# Patient Record
Sex: FEMALE | Race: WHITE | Employment: FULL TIME | URBAN - METROPOLITAN AREA
[De-identification: names, ages, dates, MRNs, and addresses within clinical notes are randomized per-mention and may not be internally consistent; named-entity substitution may affect disease eponyms.]

---

## 2018-05-08 ENCOUNTER — HOSPITAL ENCOUNTER (OUTPATIENT)
Age: 28
Setting detail: OUTPATIENT SURGERY
Discharge: HOME | End: 2018-05-08
Attending: INTERNAL MEDICINE | Admitting: INTERNAL MEDICINE
Payer: COMMERCIAL

## 2018-05-08 ENCOUNTER — ANESTHESIA EVENT (OUTPATIENT)
Dept: ENDOSCOPY | Age: 28
End: 2018-05-08
Payer: COMMERCIAL

## 2018-05-08 ENCOUNTER — ANESTHESIA (OUTPATIENT)
Dept: ENDOSCOPY | Age: 28
End: 2018-05-08
Payer: COMMERCIAL

## 2018-05-08 VITALS
SYSTOLIC BLOOD PRESSURE: 118 MMHG | RESPIRATION RATE: 10 BRPM | DIASTOLIC BLOOD PRESSURE: 68 MMHG | HEART RATE: 71 BPM | BODY MASS INDEX: 41.68 KG/M2 | OXYGEN SATURATION: 95 % | WEIGHT: 250.19 LBS | TEMPERATURE: 97.9 F | HEIGHT: 65 IN

## 2018-05-08 LAB — HCG UR QL: NEGATIVE

## 2018-05-08 PROCEDURE — 76060000031 HC ANESTHESIA FIRST 0.5 HR: Performed by: INTERNAL MEDICINE

## 2018-05-08 PROCEDURE — 74011000250 HC RX REV CODE- 250

## 2018-05-08 PROCEDURE — 76040000019: Performed by: INTERNAL MEDICINE

## 2018-05-08 PROCEDURE — 81025 URINE PREGNANCY TEST: CPT

## 2018-05-08 PROCEDURE — 74011250636 HC RX REV CODE- 250/636: Performed by: INTERNAL MEDICINE

## 2018-05-08 PROCEDURE — 77030019988 HC FCPS ENDOSC DISP BSC -B: Performed by: INTERNAL MEDICINE

## 2018-05-08 PROCEDURE — 74011250636 HC RX REV CODE- 250/636

## 2018-05-08 PROCEDURE — 88305 TISSUE EXAM BY PATHOLOGIST: CPT | Performed by: INTERNAL MEDICINE

## 2018-05-08 RX ORDER — SODIUM CHLORIDE 0.9 % (FLUSH) 0.9 %
5-10 SYRINGE (ML) INJECTION AS NEEDED
Status: DISCONTINUED | OUTPATIENT
Start: 2018-05-08 | End: 2018-05-08 | Stop reason: HOSPADM

## 2018-05-08 RX ORDER — SODIUM CHLORIDE 0.9 % (FLUSH) 0.9 %
5-10 SYRINGE (ML) INJECTION EVERY 8 HOURS
Status: DISCONTINUED | OUTPATIENT
Start: 2018-05-08 | End: 2018-05-08 | Stop reason: HOSPADM

## 2018-05-08 RX ORDER — SODIUM CHLORIDE 9 MG/ML
75 INJECTION, SOLUTION INTRAVENOUS CONTINUOUS
Status: DISCONTINUED | OUTPATIENT
Start: 2018-05-08 | End: 2018-05-08 | Stop reason: HOSPADM

## 2018-05-08 RX ORDER — NALOXONE HYDROCHLORIDE 0.4 MG/ML
0.4 INJECTION, SOLUTION INTRAMUSCULAR; INTRAVENOUS; SUBCUTANEOUS
Status: DISCONTINUED | OUTPATIENT
Start: 2018-05-08 | End: 2018-05-08 | Stop reason: HOSPADM

## 2018-05-08 RX ORDER — FENTANYL CITRATE 50 UG/ML
25 INJECTION, SOLUTION INTRAMUSCULAR; INTRAVENOUS
Status: DISCONTINUED | OUTPATIENT
Start: 2018-05-08 | End: 2018-05-08 | Stop reason: HOSPADM

## 2018-05-08 RX ORDER — EPINEPHRINE 0.1 MG/ML
1 INJECTION INTRACARDIAC; INTRAVENOUS
Status: DISCONTINUED | OUTPATIENT
Start: 2018-05-08 | End: 2018-05-08 | Stop reason: HOSPADM

## 2018-05-08 RX ORDER — LIDOCAINE HYDROCHLORIDE 20 MG/ML
INJECTION, SOLUTION EPIDURAL; INFILTRATION; INTRACAUDAL; PERINEURAL AS NEEDED
Status: DISCONTINUED | OUTPATIENT
Start: 2018-05-08 | End: 2018-05-08 | Stop reason: HOSPADM

## 2018-05-08 RX ORDER — ATROPINE SULFATE 0.1 MG/ML
0.5 INJECTION INTRAVENOUS
Status: DISCONTINUED | OUTPATIENT
Start: 2018-05-08 | End: 2018-05-08 | Stop reason: HOSPADM

## 2018-05-08 RX ORDER — GLYCOPYRROLATE 0.2 MG/ML
INJECTION INTRAMUSCULAR; INTRAVENOUS AS NEEDED
Status: DISCONTINUED | OUTPATIENT
Start: 2018-05-08 | End: 2018-05-08 | Stop reason: HOSPADM

## 2018-05-08 RX ORDER — FLUMAZENIL 0.1 MG/ML
0.2 INJECTION INTRAVENOUS
Status: DISCONTINUED | OUTPATIENT
Start: 2018-05-08 | End: 2018-05-08 | Stop reason: HOSPADM

## 2018-05-08 RX ORDER — ZINC GLUCONATE 10 MG
LOZENGE ORAL
COMMUNITY
End: 2020-03-25

## 2018-05-08 RX ORDER — DEXTROMETHORPHAN/PSEUDOEPHED 2.5-7.5/.8
1.2 DROPS ORAL
Status: DISCONTINUED | OUTPATIENT
Start: 2018-05-08 | End: 2018-05-08 | Stop reason: HOSPADM

## 2018-05-08 RX ORDER — CETIRIZINE HCL 10 MG
10 TABLET ORAL DAILY
COMMUNITY

## 2018-05-08 RX ORDER — RANITIDINE HCL 75 MG
75 TABLET ORAL DAILY
COMMUNITY
End: 2019-07-30

## 2018-05-08 RX ORDER — MIDAZOLAM HYDROCHLORIDE 1 MG/ML
1-2 INJECTION, SOLUTION INTRAMUSCULAR; INTRAVENOUS
Status: DISCONTINUED | OUTPATIENT
Start: 2018-05-08 | End: 2018-05-08 | Stop reason: HOSPADM

## 2018-05-08 RX ORDER — GLUCOSAMINE/CHONDR SU A SOD 750-600 MG
TABLET ORAL
COMMUNITY
End: 2020-03-25

## 2018-05-08 RX ORDER — MIDAZOLAM HYDROCHLORIDE 1 MG/ML
.25-5 INJECTION, SOLUTION INTRAMUSCULAR; INTRAVENOUS
Status: DISCONTINUED | OUTPATIENT
Start: 2018-05-08 | End: 2018-05-08 | Stop reason: HOSPADM

## 2018-05-08 RX ORDER — OMEPRAZOLE 20 MG/1
20 CAPSULE, DELAYED RELEASE ORAL DAILY
COMMUNITY
End: 2018-10-30 | Stop reason: SDUPTHER

## 2018-05-08 RX ORDER — PROPOFOL 10 MG/ML
INJECTION, EMULSION INTRAVENOUS AS NEEDED
Status: DISCONTINUED | OUTPATIENT
Start: 2018-05-08 | End: 2018-05-08 | Stop reason: HOSPADM

## 2018-05-08 RX ORDER — MELATONIN
1000 DAILY
COMMUNITY
End: 2019-07-30

## 2018-05-08 RX ADMIN — PROPOFOL 400 MG: 10 INJECTION, EMULSION INTRAVENOUS at 09:34

## 2018-05-08 RX ADMIN — LIDOCAINE HYDROCHLORIDE 100 MG: 20 INJECTION, SOLUTION EPIDURAL; INFILTRATION; INTRACAUDAL; PERINEURAL at 09:25

## 2018-05-08 RX ADMIN — GLYCOPYRROLATE 0.2 MG: 0.2 INJECTION INTRAMUSCULAR; INTRAVENOUS at 09:25

## 2018-05-08 RX ADMIN — SODIUM CHLORIDE 75 ML/HR: 900 INJECTION, SOLUTION INTRAVENOUS at 09:18

## 2018-05-08 NOTE — ANESTHESIA POSTPROCEDURE EVALUATION
Post-Anesthesia Evaluation and Assessment    Patient: Jersey Junior MRN: [id number]  SSN: [id number]    YOB: 1990  Age: 29 y.o. Sex: female       Cardiovascular Function/Vital Signs  Visit Vitals    /68    Pulse 71    Temp 36.6 °C (97.9 °F)    Resp 10    Ht 5' 5\" (1.651 m)    Wt 113.5 kg (250 lb 3 oz)    SpO2 95%    Breastfeeding No    BMI 41.63 kg/m2       Patient is status post total IV anesthesia anesthesia for Procedure(s):  ESOPHAGOGASTRODUODENOSCOPY (EGD)  ESOPHAGOGASTRODUODENAL (EGD) BIOPSY. Nausea/Vomiting: None    Postoperative hydration reviewed and adequate. Pain:  Pain Scale 1: Numeric (0 - 10) (05/08/18 0958)  Pain Intensity 1: 0 (05/08/18 0958)   Managed    Neurological Status: At baseline    Mental Status and Level of Consciousness: Arousable    Pulmonary Status:   O2 Device: Room air (05/08/18 0958)   Adequate oxygenation and airway patent    Complications related to anesthesia: None    Post-anesthesia assessment completed.  No concerns    Signed By: Prakash Nguyen DO     May 8, 2018

## 2018-05-08 NOTE — PERIOP NOTES
Latrice Randolph  1990  [id number]    Situation:  Verbal report received from: ANGELINE Santoro rn  Procedure: Procedure(s):  ESOPHAGOGASTRODUODENOSCOPY (EGD)  ESOPHAGOGASTRODUODENAL (EGD) BIOPSY    Background:    Preoperative diagnosis: gerd  Postoperative diagnosis: GERD    :  Dr. Brian Ivy  Assistant(s): Endoscopy Technician-1: Rik Palacio  Endoscopy RN-1: Abel Mast RN    Specimens:   ID Type Source Tests Collected by Time Destination   1 : [place] bx Preservative   Tahira Dan MD 5/8/2018 0932 Pathology     H. Pylori  no    Assessment:  Anesthesia gave intra-procedure sedation and medications, see anesthesia flow sheet yes    Intravenous fluids: NS@ KVO     Vital signs stable     Abdominal assessment: round and soft     Recommendation:  Discharge patient per MD order.     Family or Friend   Permission to share finding with family or friend yes

## 2018-05-08 NOTE — DISCHARGE INSTRUCTIONS
[place]  [id number]  1990    EGD DISCHARGE INSTRUCTIONS  Discomfort:  Sore throat- throat lozenges or warm salt water gargle  redness at IV site- apply warm compress to area; if redness or soreness persist- contact your physician  Gaseous discomfort- walking, belching will help relieve any discomfort  You may not operate a vehicle for 12 hours  You may not engage in an occupation involving machinery or appliances for rest of today  You may not drink alcoholic beverages for at least 12 hours  Avoid making any critical decisions for at least 24 hour  DIET  You may have minimal sips at this time-- do not eat or drink for two hours. You may eat and drink after 0945am  You may resume your regular diet - however -  remember your colon is empty and a heavy meal will produce gas. Avoid these foods:  vegetables, fried / greasy foods, carbonated drinks    MEDICATIONS:        ACTIVITY  You may resume your normal daily activities until tomorrow AM;  Spend the remainder of the day resting -  avoid any strenuous activity. CALL M.D. ANY SIGN OF   Increasing pain, nausea, vomiting  Abdominal distension (swelling)  New increased bleeding (oral or rectal)  Fever (chills)  Pain in chest area  Bloody discharge from nose or mouth  Shortness of breath    IMPRESSION:  -Normal esophageal mucosa; biopsied to exclude esophagitis  -Normal stomach  -Normal duodenum    Follow-up Instructions:   Call Dr. Jonathan Melvin for the results of procedure / biopsy in 7-10 days   Telephone # [phone]  Appointment in 58-80 days    Deidra Nguyen MD     Flextrip Activation    Thank you for requesting access to Flextrip. Please follow the instructions below to securely access and download your online medical record. Flextrip allows you to send messages to your doctor, view your test results, renew your prescriptions, schedule appointments, and more. How Do I Sign Up? 1. In your internet browser, go to [email or web address]  2.  Click on the First Time User? Click Here link in the Sign In box. You will be redirect to the New Member Sign Up page. 3. Enter your walkby Access Code exactly as it appears below. You will not need to use this code after youve completed the sign-up process. If you do not sign up before the expiration date, you must request a new code. walkby Access Code: [email or web address]  Expires: 2018  8:00 AM (This is the date your walkby access code will )    4. Enter the last four digits of your Social Security Number (xxxx) and Date of Birth (mm/dd/yyyy) as indicated and click Submit. You will be taken to the next sign-up page. 5. Create a Correlsenset ID. This will be your walkby login ID and cannot be changed, so think of one that is secure and easy to remember. 6. Create a walkby password. You can change your password at any time. 7. Enter your Password Reset Question and Answer. This can be used at a later time if you forget your password. 8. Enter your e-mail address. You will receive e-mail notification when new information is available in [address] 9. Click Sign Up. You can now view and download portions of your medical record. 10. Click the Download Summary menu link to download a portable copy of your medical information. Additional Information    If you have questions, please visit the Frequently Asked Questions section of the walkby website at [email or web address] Remember, walkby is NOT to be used for urgent needs. For medical emergencies, dial 911.

## 2018-05-08 NOTE — IP AVS SNAPSHOT
[address] [facility] 
[phone] Patient: Tawny Harrell MRN: [id number] About your hospitalization You were admitted on: May 8, 2018 You last received care in the:  [facility] ENDOSCOPY You were discharged on: May 8, 2018 Why you were hospitalized Your primary diagnosis was:  Not on File Follow-up Information None Discharge Orders None A check sosa indicates which time of day the medication should be taken. My Medications CONTINUE taking these medications Instructions Each Dose to Equal  
 Morning Noon Evening Bedtime Biotin 2,500 mcg Cap Your last dose was: Your next dose is: Take  by mouth.  
     
   
   
   
  
 magnesium 250 mg Tab Your last dose was: Your next dose is: Take  by mouth. OTHER Your last dose was: Your next dose is: Low estrogen birth control PriLOSEC 20 mg capsule Generic drug:  omeprazole Your last dose was: Your next dose is: Take 20 mg by mouth daily. 20 mg  
    
   
   
   
  
 VITAMIN D3 1,000 unit tablet Generic drug:  cholecalciferol Your last dose was: Your next dose is: Take 1,000 Units by mouth daily. 1000 Units ZANTAC 75 75 mg tablet Generic drug:  raNITIdine Your last dose was: Your next dose is: Take 75 mg by mouth two (2) times a day. 75 mg  
    
   
   
   
  
 ZyrTEC 10 mg tablet Generic drug:  cetirizine Your last dose was: Your next dose is: Take  by mouth. Discharge Instructions Tawny Harrell 
[id number] 1990 EGD DISCHARGE INSTRUCTIONS Discomfort: 
Sore throat- throat lozenges or warm salt water gargle redness at IV site- apply warm compress to area; if redness or soreness persist- contact your physician Gaseous discomfort- walking, belching will help relieve any discomfort You may not operate a vehicle for 12 hours You may not engage in an occupation involving machinery or appliances for rest of today You may not drink alcoholic beverages for at least 12 hours Avoid making any critical decisions for at least 24 hour DIET You may have minimal sips at this time-- do not eat or drink for two hours. You may eat and drink after 0945am 
You may resume your regular diet  however -  remember your colon is empty and a heavy meal will produce gas. Avoid these foods:  vegetables, fried / greasy foods, carbonated drinks MEDICATIONS: 
 
 
 
ACTIVITY You may resume your normal daily activities until tomorrow AM; 
Spend the remainder of the day resting -  avoid any strenuous activity. CALL M.D. ANY SIGN OF Increasing pain, nausea, vomiting Abdominal distension (swelling) New increased bleeding (oral or rectal) Fever (chills) Pain in chest area Bloody discharge from nose or mouth Shortness of breath IMPRESSION: 
-Normal esophageal mucosa; biopsied to exclude esophagitis 
-Normal stomach 
-Normal duodenum Follow-up Instructions: 
 Call Dr. Zac Izquierdo for the results of procedure / biopsy in 7-10 days Telephone # [phone] Appointment in 60-90 days Del Nissen, MD 
 
 Revolution Prep Activation Thank you for requesting access to Revolution Prep. Please follow the instructions below to securely access and download your online medical record. Revolution Prep allows you to send messages to your doctor, view your test results, renew your prescriptions, schedule appointments, and more. How Do I Sign Up? 1. In your internet browser, go to [email or web address] 
2. Click on the First Time User? Click Here link in the Sign In box. You will be redirect to the New Member Sign Up page. 3. Enter your Fitbay Access Code exactly as it appears below. You will not need to use this code after youve completed the sign-up process. If you do not sign up before the expiration date, you must request a new code. Fitbay Access Code: [email or web address] Expires: 2018  8:00 AM (This is the date your Fitbay access code will ) 4. Enter the last four digits of your Social Security Number (xxxx) and Date of Birth (mm/dd/yyyy) as indicated and click Submit. You will be taken to the next sign-up page. 5. Create a Tower Semiconductort ID. This will be your Fitbay login ID and cannot be changed, so think of one that is secure and easy to remember. 6. Create a Fitbay password. You can change your password at any time. 7. Enter your Password Reset Question and Answer. This can be used at a later time if you forget your password. 8. Enter your e-mail address. You will receive e-mail notification when new information is available in [address] 9. Click Sign Up. You can now view and download portions of your medical record. 10. Click the Download Summary menu link to download a portable copy of your medical information. Additional Information If you have questions, please visit the Frequently Asked Questions section of the Fitbay website at [email or web address] Remember, Fitbay is NOT to be used for urgent needs. For medical emergencies, dial 911. Introducing [facility]! [employer] introduces Fitbay patient portal. Now you can access parts of your medical record, email your doctor's office, and request medication refills online. 1. In your internet browser, go to [email or web address] 2. Click on the First Time User? Click Here link in the Sign In box. You will see the New Member Sign Up page. 3. Enter your Fitbay Access Code exactly as it appears below. You will not need to use this code after youve completed the sign-up process.  If you do not sign up before the expiration date, you must request a new code. · GreenDust Access Code: [email or web address] Expires: 8/6/2018  8:00 AM 
 
4. Enter the last four digits of your Social Security Number (xxxx) and Date of Birth (mm/dd/yyyy) as indicated and click Submit. You will be taken to the next sign-up page. 5. Create a GreenDust ID. This will be your GreenDust login ID and cannot be changed, so think of one that is secure and easy to remember. 6. Create a GreenDust password. You can change your password at any time. 7. Enter your Password Reset Question and Answer. This can be used at a later time if you forget your password. 8. Enter your e-mail address. You will receive e-mail notification when new information is available in [address] 9. Click Sign Up. You can now view and download portions of your medical record. 10. Click the Download Summary menu link to download a portable copy of your medical information. If you have questions, please visit the Frequently Asked Questions section of the GreenDust website. Remember, GreenDust is NOT to be used for urgent needs. For medical emergencies, dial 911. Now available from your iPhone and Android! Introducing Arben Wong As a [employer] patient, I wanted to make you aware of our electronic visit tool called Arben Wong. [employer] 24/7 allows you to connect within minutes with a medical provider 24 hours a day, seven days a week via a mobile device or tablet or logging into a secure website from your computer. You can access Arben Wong from anywhere in the [place].  
 
A virtual visit might be right for you when you have a simple condition and feel like you just dont want to get out of bed, or cant get away from work for an appointment, when your regular [employer] provider is not available (evenings, weekends or holidays), or when youre out of town and need minor care. Electronic visits cost only $49 and if the kWhOURS 24/7 provider determines a prescription is needed to treat your condition, one can be electronically transmitted to a nearby pharmacy*. Please take a moment to enroll today if you have not already done so. The enrollment process is free and takes just a few minutes. To enroll, please download the [employer] 24/[employer] jose to your tablet or phone, or visit [email or web address] to enroll on your computer. And, as an [address] [place] [address] patient with a [employer] account, the results of your visits will be scanned into your electronic medical record and your primary care provider will be able to view the scanned results. We urge you to continue to see your regular [employer] provider for your ongoing medical care. And while your primary care provider may not be the one available when you seek a Trippin In virtual visit, the peace of mind you get from getting a real diagnosis real time can be priceless. For more information on Trippin In, view our Frequently Asked Questions (FAQs) at [email or web address]. Sincerely, 
 
Sowmya Lopez MD 
Chief Medical Officer [employer] *:  certain medications cannot be prescribed via Trippin In Providers Seen During Your Hospitalization Provider Specialty Primary office phone Jameel Angela MD Gastroenterology [phone] Your Primary Care Physician (PCP) Primary Care Physician Office Phone Office Fax Daniele Cox [phone] You are allergic to the following No active allergies Recent Documentation Height Weight Breastfeeding? BMI OB Status Smoking Status 1.651 m 113.5 kg No 41.63 kg/m2 Having regular periods Never Smoker Emergency Contacts Name Discharge Info Relation Home Work Mobile Merlin Jorge DISCHARGE CAREGIVER [3] Spouse [3] [phone] Patient Belongings The following personal items are in your possession at time of discharge: 
  Dental Appliances: None Please provide this summary of care documentation to your next provider. Signatures-by signing, you are acknowledging that this After Visit Summary has been reviewed with you and you have received a copy. Patient Signature:  ____________________________________________________________ Date:  ____________________________________________________________  
  
Juju Piety Provider Signature:  ____________________________________________________________ Date:  ____________________________________________________________

## 2018-05-08 NOTE — PROGRESS NOTES
Anesthesia reports 400 mg Propofol, 100 mg Lidocaine and 600 mL NS given during procedure. Received report from anesthesia staff on vital signs and status of patient.

## 2018-05-08 NOTE — ANESTHESIA PREPROCEDURE EVALUATION
Anesthetic History   No history of anesthetic complications            Review of Systems / Medical History  Patient summary reviewed, nursing notes reviewed and pertinent labs reviewed    Pulmonary  Within defined limits                 Neuro/Psych   Within defined limits           Cardiovascular  Within defined limits                Exercise tolerance: >4 METS     GI/Hepatic/Renal  Within defined limits              Endo/Other        Obesity     Other Findings              Physical Exam    Airway  Mallampati: II  TM Distance: 4 - 6 cm  Neck ROM: normal range of motion   Mouth opening: Normal     Cardiovascular  Regular rate and rhythm,  S1 and S2 normal,  no murmur, click, rub, or gallop             Dental  No notable dental hx       Pulmonary  Breath sounds clear to auscultation               Abdominal  GI exam deferred       Other Findings            Anesthetic Plan    ASA: 2  Anesthesia type: total IV anesthesia and MAC          Induction: Intravenous  Anesthetic plan and risks discussed with: Patient

## 2018-07-30 ENCOUNTER — OFFICE VISIT (OUTPATIENT)
Dept: INTERNAL MEDICINE CLINIC | Age: 28
End: 2018-07-30

## 2018-07-30 VITALS
BODY MASS INDEX: 40.15 KG/M2 | WEIGHT: 241 LBS | RESPIRATION RATE: 16 BRPM | TEMPERATURE: 98.3 F | HEIGHT: 65 IN | DIASTOLIC BLOOD PRESSURE: 77 MMHG | HEART RATE: 89 BPM | OXYGEN SATURATION: 99 % | SYSTOLIC BLOOD PRESSURE: 109 MMHG

## 2018-07-30 DIAGNOSIS — E66.01 OBESITY, MORBID (HCC): ICD-10-CM

## 2018-07-30 DIAGNOSIS — Z00.00 PHYSICAL EXAM, ANNUAL: Primary | ICD-10-CM

## 2018-07-30 DIAGNOSIS — Z23 ENCOUNTER FOR IMMUNIZATION: ICD-10-CM

## 2018-07-30 RX ORDER — SERTRALINE HYDROCHLORIDE 50 MG/1
50 TABLET, FILM COATED ORAL DAILY
Qty: 30 TAB | Refills: 2 | Status: SHIPPED | OUTPATIENT
Start: 2018-07-30 | End: 2018-10-02 | Stop reason: SDUPTHER

## 2018-07-30 NOTE — PROGRESS NOTES
After obtaining consent, and per verbal order from Dr. Nga Bailey, patient received TDAP vaccine given by Prosper Zavaleta LPN in L Deltoid. TDAP Vaccine 0.5 mL IM now. Patient was observed for 10 minutes post injection. Patient tolerated injection well. VIS given.

## 2018-07-30 NOTE — PROGRESS NOTES
HISTORY OF PRESENT ILLNESS  Harrison Vasquez is a 29 y.o. female. HPI  Pt is here to establish care. /77, will repeat this today    Pt has lost 25 lbs recently  Pt did Whole 30 diet to start  More recently has just been cutting out carbs and sugar  Pt wants to lose more wt, but will maintain for now so that she will fit in her wedding dress  Pt will restart her wt loss after her wedding    Labs ordered    Pt had EGD with Dr Loren Guerra in 5/18: nl  Pt is taking prilosec daily and zantac BID for heartburn  Pt has f/u in 8/18    Pt states she has been dealing with anxiety for a long time  Denies depression  She feels that this is wearing on other people  It does not negatively affect her job, but she has been stressed  Ordered zoloft 50mg    Pt c/o HA x 8 years  She believes these are menstrual migraines, as they occur with her periods  Pt has discussed these sx with her gyn    PMHx: GERD    FMHx: Mother - renal cell carcinoma  Father - HTN    PSHx: [place] teeth extraction  Upper GI endoscopy    SHx: Never smoker  Occasional alcohol  Not , engaged (wedding in 9/18)  No children  Works as a physical therapist    PREVENTIVE:  Colonoscopy: not yet needed  Pap: neoSurgical, 07/30/18   Mammogram: not yet needed  Dexa: not yet needed  Tdap: 07/30/18  Pneumovax: not yet needed  Shingrix: not yet needed  Flu shot: Fall 2017  Eye exam: 3/18  Lipids: ordered 07/30/18    There are no active problems to display for this patient. Current Outpatient Prescriptions   Medication Sig Dispense Refill    omeprazole (PRILOSEC) 20 mg capsule Take 20 mg by mouth daily.  raNITIdine (ZANTAC 75) 75 mg tablet Take 75 mg by mouth daily.  cetirizine (ZYRTEC) 10 mg tablet Take  by mouth.  OTHER Low estrogen birth control      magnesium 250 mg tab Take  by mouth.  Biotin 2,500 mcg cap Take  by mouth.  cholecalciferol (VITAMIN D3) 1,000 unit tablet Take 1,000 Units by mouth daily.        Past Surgical History:   Procedure Laterality Date    HX WISDOM TEETH EXTRACTION      UPPER GI ENDOSCOPY,BIOPSY  5/8/2018           No results found for: WBC, WBCT, WBCPOC, HGB, HGBPOC, HCT, HCTPOC, PLT, PLTPOC, MCV, MCVPOC, HGBEXT, HCTEXT, PLTEXT  No results found for: CHOL, CHOLPOCT, HDL, LDL, LDLC, LDLCPOC, LDLCEXT, TRIGL, TGLPOCT, CHHD, CHHDX  No results found for: GFRNA, GFRNAPOC, GFRAA, GFRAAPOC, CREA, CREAPOC, BUN, IBUN, BUNPOC, NA, NAPOC, K, KPOCT, CL, CLPOC, CO2, CO2POC, MG, PHOS, ALBEU, PTH, PTHILT, EPO     Review of Systems   Constitutional: Negative for chills and fever. HENT: Negative for hearing loss and tinnitus. Eyes: Negative for blurred vision and double vision. Respiratory: Negative for shortness of breath and wheezing. Cardiovascular: Negative for chest pain and palpitations. Gastrointestinal: Negative for nausea and vomiting. Genitourinary: Negative for dysuria and frequency. Musculoskeletal: Negative for back pain and falls. Skin: Negative for itching and rash. Neurological: Positive for headaches. Negative for dizziness and loss of consciousness. Psychiatric/Behavioral: Negative for depression. The patient is nervous/anxious. Physical Exam   Constitutional: She is oriented to person, place, and time. She appears well-developed and well-nourished. No distress. HENT:   Head: Normocephalic and atraumatic. Right Ear: External ear normal.   Left Ear: External ear normal.   Mouth/Throat: Oropharynx is clear and moist. No oropharyngeal exudate. Eyes: Conjunctivae and EOM are normal. Pupils are equal, round, and reactive to light. Right eye exhibits no discharge. Left eye exhibits no discharge. No scleral icterus. Neck: Normal range of motion. Neck supple. No carotid bruits    Cardiovascular: Normal rate, regular rhythm, normal heart sounds and intact distal pulses. Exam reveals no gallop and no friction rub. No murmur heard.   Pulmonary/Chest: Effort normal and breath sounds normal. No respiratory distress. She has no wheezes. She has no rales. She exhibits no tenderness. Abdominal: Soft. She exhibits no distension and no mass. There is no tenderness. There is no rebound and no guarding. Musculoskeletal: Normal range of motion. She exhibits no edema, tenderness or deformity. Lymphadenopathy:     She has no cervical adenopathy. Neurological: She is alert and oriented to person, place, and time. Coordination normal.   Skin: Skin is warm and dry. No rash noted. She is not diaphoretic. No erythema. No pallor. Psychiatric: She has a normal mood and affect. Her behavior is normal.       ASSESSMENT and PLAN    ICD-10-CM ICD-9-CM    1. Physical exam, annual    Discussed need for diet and w/l, she has already lost about 20lbs and is working aggressively on this, due for labs, ordered, tdap today, BP good, pelvic today at gyn office, flu shot annually in the fall   Z00.00 V70.0 LIPID PANEL      METABOLIC PANEL, COMPREHENSIVE      CBC W/O DIFF      TSH 3RD GENERATION      URINALYSIS W/ RFLX MICROSCOPIC      HEMOGLOBIN A1C WITH EAG   2. Obesity, morbid ([address]    See above, working on portion control for continued wt loss   E66.01 278.01       3 anxiety --start zoloft 50mg for tx. Scribed by Ivette Vásquez of [facility], as dictated by Dr. Veronica Chang. Current diagnosis and concerns discussed with pt at length. Pt understands risks and benefits or current treatment plan and medications, and accepts the treatment and medication with any possible risks. Pt asks appropriate questions, which were answered. Pt was instructed to call with any concerns or problems. This note will not be viewable in [address]

## 2018-07-30 NOTE — MR AVS SNAPSHOT
[address] 
[phone] Patient: Jake Ley MRN: [id number] WQQ:[id number] Visit Information Date & Time Provider Department Dept. Phone Encounter #  
 7/30/2018  9:30 AM Masood Alvarado [address] [place] [address],[address] [phone] [id number] Follow-up Instructions Return in about 3 months (around 10/30/2018). Upcoming Health Maintenance Date Due DTaP/Tdap/Td series (1 - Tdap) 5/3/2011 Influenza Age 5 to Adult 8/1/2018 PAP AKA CERVICAL CYTOLOGY 7/30/2021 Allergies as of 7/30/2018  Review Complete On: 7/30/2018 By: Piyush Adrian LPN No Known Allergies Current Immunizations  Never Reviewed Name Date Influenza Vaccine 10/30/2017 Not reviewed this visit You Were Diagnosed With   
  
 Codes Comments Physical exam, annual    -  Primary ICD-10-CM: Z00.00 ICD-9-CM: V70.0 Obesity, morbid ([facility] [address]     ICD-10-CM: E66.01 
ICD-9-CM: 278.01 Vitals BP Pulse Temp Resp Height(growth percentile) Weight(growth percentile) 109/77 (BP 1 Location: Left arm, BP Patient Position: Sitting) (!) 114 98.3 °F (36.8 °C) (Oral) 16 5' 4.5\" (1.638 m) 241 lb (109.3 kg) SpO2 BMI OB Status Smoking Status 99% 40.73 kg/m2 Having regular periods Never Smoker Vitals History BMI and BSA Data Body Mass Index Body Surface Area 40.73 kg/m 2 2.23 m 2 Preferred Pharmacy Pharmacy Name Phone [id number] [place], [address] [phone] Your Updated Medication List  
  
   
This list is accurate as of 7/30/18  9:51 AM.  Always use your most recent med list.  
  
  
  
  
 Biotin 2,500 mcg Cap Take  by mouth.  
  
 magnesium 250 mg Tab Take  by mouth. OTHER Low estrogen birth control PriLOSEC 20 mg capsule Generic drug:  omeprazole Take 20 mg by mouth daily. sertraline 50 mg tablet Commonly known as:  ZOLOFT Take 1 Tab by mouth daily. VITAMIN D3 1,000 unit tablet Generic drug:  cholecalciferol Take 1,000 Units by mouth daily. ZANTAC 75 75 mg tablet Generic drug:  raNITIdine Take 75 mg by mouth daily. ZyrTEC 10 mg tablet Generic drug:  cetirizine Take  by mouth. Prescriptions Sent to Pharmacy Refills  
 sertraline (ZOLOFT) 50 mg tablet 2 Sig: Take 1 Tab by mouth daily. Class: Normal  
 Pharmacy: [id number]- [address]  #: [phone] Route: Oral  
  
We Performed the Following CBC W/O DIFF [26188 CPT(R)] HEMOGLOBIN A1C WITH EAG [18690 CPT(R)] LIPID PANEL [95792 CPT(R)] METABOLIC PANEL, COMPREHENSIVE [61217 CPT(R)] TSH 3RD GENERATION [73348 CPT(R)] URINALYSIS W/ RFLX MICROSCOPIC [63563 CPT(R)] Follow-up Instructions Return in about 3 months (around 10/30/2018). Introducing [facility]! Dear Martha Elmoer: 
Thank you for requesting a Oncoscope account. Our records indicate that you already have an active Oncoscope account. You can access your account anytime at [email or web address] Did you know that you can access your hospital and ER discharge instructions at any time in Oncoscope? You can also review all of your test results from your hospital stay or ER visit. Additional Information If you have questions, please visit the Frequently Asked Questions section of the Oncoscope website at [email or web address] Remember, Oncoscope is NOT to be used for urgent needs. For medical emergencies, dial 911. Now available from your iPhone and Android! Please provide this summary of care documentation to your next provider. Your primary care clinician is listed as Fernanda Carvalho.  If you have any questions after today's visit, please call [phone].

## 2018-07-31 LAB
ALBUMIN SERPL-MCNC: 4.1 G/DL (ref 3.5–5.5)
ALBUMIN/GLOB SERPL: 1.8 {RATIO} (ref 1.2–2.2)
ALP SERPL-CCNC: 47 IU/L (ref 39–117)
ALT SERPL-CCNC: 24 IU/L (ref 0–32)
APPEARANCE UR: CLEAR
AST SERPL-CCNC: 18 IU/L (ref 0–40)
BILIRUB SERPL-MCNC: 0.2 MG/DL (ref 0–1.2)
BILIRUB UR QL STRIP: NEGATIVE
BUN SERPL-MCNC: 12 MG/DL (ref 6–20)
BUN/CREAT SERPL: 18 (ref 9–23)
CALCIUM SERPL-MCNC: 8.9 MG/DL (ref 8.7–10.2)
CHLORIDE SERPL-SCNC: 103 MMOL/L (ref 96–106)
CHOLEST SERPL-MCNC: 164 MG/DL (ref 100–199)
CO2 SERPL-SCNC: 23 MMOL/L (ref 20–29)
COLOR UR: YELLOW
CREAT SERPL-MCNC: 0.65 MG/DL (ref 0.57–1)
ERYTHROCYTE [DISTWIDTH] IN BLOOD BY AUTOMATED COUNT: 13.3 % (ref 12.3–15.4)
EST. AVERAGE GLUCOSE BLD GHB EST-MCNC: 103 MG/DL
GLOBULIN SER CALC-MCNC: 2.3 G/DL (ref 1.5–4.5)
GLUCOSE SERPL-MCNC: 88 MG/DL (ref 65–99)
GLUCOSE UR QL: NEGATIVE
HBA1C MFR BLD: 5.2 % (ref 4.8–5.6)
HCT VFR BLD AUTO: 38.6 % (ref 34–46.6)
HDLC SERPL-MCNC: 43 MG/DL
HGB BLD-MCNC: 12.8 G/DL (ref 11.1–15.9)
HGB UR QL STRIP: NEGATIVE
KETONES UR QL STRIP: NEGATIVE
LDLC SERPL CALC-MCNC: 64 MG/DL (ref 0–99)
LEUKOCYTE ESTERASE UR QL STRIP: NEGATIVE
MCH RBC QN AUTO: 29 PG (ref 26.6–33)
MCHC RBC AUTO-ENTMCNC: 33.2 G/DL (ref 31.5–35.7)
MCV RBC AUTO: 88 FL (ref 79–97)
MICRO URNS: NORMAL
NITRITE UR QL STRIP: NEGATIVE
PH UR STRIP: 7.5 [PH] (ref 5–7.5)
PLATELET # BLD AUTO: 292 X10E3/UL (ref 150–379)
POTASSIUM SERPL-SCNC: 4.1 MMOL/L (ref 3.5–5.2)
PROT SERPL-MCNC: 6.4 G/DL (ref 6–8.5)
PROT UR QL STRIP: NEGATIVE
RBC # BLD AUTO: 4.41 X10E6/UL (ref 3.77–5.28)
SODIUM SERPL-SCNC: 141 MMOL/L (ref 134–144)
SP GR UR: 1.02 (ref 1–1.03)
TRIGL SERPL-MCNC: 283 MG/DL (ref 0–149)
TSH SERPL DL<=0.005 MIU/L-ACNC: 1.29 UIU/ML (ref 0.45–4.5)
UROBILINOGEN UR STRIP-MCNC: 0.2 MG/DL (ref 0.2–1)
VLDLC SERPL CALC-MCNC: 57 MG/DL (ref 5–40)
WBC # BLD AUTO: 7.1 X10E3/UL (ref 3.4–10.8)

## 2018-10-02 RX ORDER — SERTRALINE HYDROCHLORIDE 50 MG/1
TABLET, FILM COATED ORAL
Qty: 30 TAB | Refills: 1 | Status: SHIPPED | OUTPATIENT
Start: 2018-10-02 | End: 2018-12-05 | Stop reason: SDUPTHER

## 2018-10-30 ENCOUNTER — OFFICE VISIT (OUTPATIENT)
Dept: INTERNAL MEDICINE CLINIC | Age: 28
End: 2018-10-30

## 2018-10-30 VITALS
HEIGHT: 65 IN | SYSTOLIC BLOOD PRESSURE: 119 MMHG | TEMPERATURE: 98 F | RESPIRATION RATE: 16 BRPM | BODY MASS INDEX: 40.98 KG/M2 | WEIGHT: 246 LBS | DIASTOLIC BLOOD PRESSURE: 81 MMHG | OXYGEN SATURATION: 97 % | HEART RATE: 91 BPM

## 2018-10-30 DIAGNOSIS — K21.9 GASTROESOPHAGEAL REFLUX DISEASE WITHOUT ESOPHAGITIS: ICD-10-CM

## 2018-10-30 DIAGNOSIS — F41.9 ANXIETY: ICD-10-CM

## 2018-10-30 DIAGNOSIS — E78.2 MIXED HYPERLIPIDEMIA: ICD-10-CM

## 2018-10-30 DIAGNOSIS — E66.01 OBESITY, MORBID (HCC): Primary | ICD-10-CM

## 2018-10-30 RX ORDER — OMEPRAZOLE 20 MG/1
20 CAPSULE, DELAYED RELEASE ORAL DAILY
Qty: 30 CAP | Refills: 8 | Status: SHIPPED | OUTPATIENT
Start: 2018-10-30 | End: 2019-07-30

## 2018-10-30 RX ORDER — NORETHINDRONE ACETATE AND ETHINYL ESTRADIOL AND FERROUS FUMARATE 1MG-20(21)
KIT ORAL
Refills: 12 | COMMUNITY
Start: 2018-10-17 | End: 2019-07-30

## 2018-10-30 NOTE — PROGRESS NOTES
HISTORY OF PRESENT ILLNESS  Grififn Mayfield is a 29 y.o. female. HPI  Last here 7/30/18. Pt is here for routine care.     BP is 119/81 today     Wt today is 246 lbs --up 5 lbs x lov  Pt had to maintain her wt recently to fit in her wedding dress  She is now back on the Whole 30 diet     Reviewed labs 7/18     Pt follows with Dr Chauncey Farfan (GI)  Last visit in 8/18  Pt takes zantac BID for heartburn  She recently stopped taking prilosec as she did not want to stay on this med longterm  Over the weekend she ate some trigger foods and had a lot of breakthrough sx  Overall however her sx are normally controlled with the zantac  Discussed that she could take prilosec prn if she is eating trigger foods     Lov, ordered zoloft 50mg for anxiety  Pt states she is doing much better now and that this has made a big difference, and that she is happy with this dose  However pt was having cold sweats consistently about 20 minutes after she took this medication  This has improved however since the first month of taking the medication  Discussed that she could try taking this med in the evening to see if it would help     Pt c/o HA x 8 years  She believes these are menstrual migraines, as they occur with her periods  Pt has discussed these sx with her gyn    Pt was  since lov     PREVENTIVE:  Colonoscopy: not yet needed  Pap: [facility], 07/30/18   Mammogram: not yet needed  Dexa: not yet needed  Tdap: 07/30/18  Pneumovax: not yet needed  Shingrix: not yet needed  Flu shot: Fall 2018  Eye exam: 3/18  Lipids: 7/18 LDL 64    Patient Active Problem List    Diagnosis Date Noted    Obesity, morbid ([facility] [address] 07/30/2018     Current Outpatient Medications   Medication Sig Dispense Refill    JUNEL FE 1/20, 28, 1 mg-20 mcg (21)/75 mg (7) tab TK 1 T PO D  12    sertraline (ZOLOFT) 50 mg tablet TAKE 1 TABLET BY MOUTH EVERY DAY 30 Tab 1    omeprazole (PRILOSEC) 20 mg capsule Take 20 mg by mouth daily.       raNITIdine (ZANTAC 75) 75 mg tablet Take 75 mg by mouth daily.  cetirizine (ZYRTEC) 10 mg tablet Take  by mouth.  magnesium 250 mg tab Take  by mouth.  Biotin 2,500 mcg cap Take  by mouth.  cholecalciferol (VITAMIN D3) 1,000 unit tablet Take 1,000 Units by mouth daily.  OTHER Low estrogen birth control       Past Surgical History:   Procedure Laterality Date    HX WISDOM TEETH EXTRACTION      UPPER GI ENDOSCOPY,BIOPSY  5/8/2018           Lab Results   Component Value Date/Time    WBC 7.1 07/30/2018 10:12 AM    HGB 12.8 07/30/2018 10:12 AM    HCT 38.6 07/30/2018 10:12 AM    PLATELET 450 87/95/0965 10:12 AM    MCV 88 07/30/2018 10:12 AM     Lab Results   Component Value Date/Time    Cholesterol, total 164 07/30/2018 10:12 AM    HDL Cholesterol 43 07/30/2018 10:12 AM    LDL, calculated 64 07/30/2018 10:12 AM    Triglyceride 283 (H) 07/30/2018 10:12 AM     Lab Results   Component Value Date/Time    GFR est non- 07/30/2018 10:12 AM    GFR est  07/30/2018 10:12 AM    Creatinine 0.65 07/30/2018 10:12 AM    BUN 12 07/30/2018 10:12 AM    Sodium 141 07/30/2018 10:12 AM    Potassium 4.1 07/30/2018 10:12 AM    Chloride 103 07/30/2018 10:12 AM    CO2 23 07/30/2018 10:12 AM        Review of Systems   Respiratory: Negative for shortness of breath. Cardiovascular: Negative for chest pain. Physical Exam   Constitutional: She is oriented to person, place, and time. She appears well-developed and well-nourished. No distress. HENT:   Head: Normocephalic and atraumatic. Mouth/Throat: Oropharynx is clear and moist. No oropharyngeal exudate. Eyes: Conjunctivae and EOM are normal. Right eye exhibits no discharge. Left eye exhibits no discharge. Neck: Normal range of motion. Neck supple. Cardiovascular: Normal rate, regular rhythm, normal heart sounds and intact distal pulses. Exam reveals no gallop and no friction rub. No murmur heard.   Pulmonary/Chest: Effort normal and breath sounds normal. No respiratory distress. She has no wheezes. She has no rales. She exhibits no tenderness. Musculoskeletal: Normal range of motion. She exhibits no edema, tenderness or deformity. Lymphadenopathy:     She has no cervical adenopathy. Neurological: She is alert and oriented to person, place, and time. Coordination normal.   Skin: Skin is warm and dry. No rash noted. She is not diaphoretic. No erythema. No pallor. Psychiatric: She has a normal mood and affect. Her behavior is normal.       ASSESSMENT and PLAN    ICD-10-CM ICD-9-CM    1. Obesity, morbid ([address]    Wt relatively stable from last office visit, she has started back on a w/l program, working on a [employer] currently   E66.01 278.01    2. Mixed hyperlipidemia    Diet controlled, primarily triglycerides are elevated, continue with w/l, no medication needed for now   E78.2 272.2    3. Anxiety    Much improved with zoloft, quite happy with dose, continue   F41.9 300.00    4. Gastroesophageal reflux disease without esophagitis    Controlled with zantac, infrequently using prilosec at this time   K21.9 530.81         Scribed by Mansoor Espinoza of [address] [place] [address] [address], as dictated by Dr. Moon Cotter. Current diagnosis and concerns discussed with pt at length. Pt understands risks and benefits or current treatment plan and medications, and accepts the treatment and medication with any possible risks. Pt asks appropriate questions, which were answered. Pt was instructed to call with any concerns or problems. I have reviewed the note documented by the scribe. The services provided are my own. The documentation is accurate This note will not be viewable in [employer].

## 2019-07-30 ENCOUNTER — OFFICE VISIT (OUTPATIENT)
Dept: INTERNAL MEDICINE CLINIC | Age: 29
End: 2019-07-30

## 2019-07-30 VITALS
HEIGHT: 65 IN | OXYGEN SATURATION: 99 % | HEART RATE: 96 BPM | BODY MASS INDEX: 42.65 KG/M2 | WEIGHT: 256 LBS | TEMPERATURE: 98 F | DIASTOLIC BLOOD PRESSURE: 80 MMHG | RESPIRATION RATE: 16 BRPM | SYSTOLIC BLOOD PRESSURE: 119 MMHG

## 2019-07-30 DIAGNOSIS — E66.01 OBESITY, MORBID (HCC): ICD-10-CM

## 2019-07-30 DIAGNOSIS — Z00.00 PHYSICAL EXAM, ANNUAL: Primary | ICD-10-CM

## 2019-07-30 DIAGNOSIS — F41.9 ANXIETY: ICD-10-CM

## 2019-07-30 NOTE — PROGRESS NOTES
HISTORY OF PRESENT ILLNESS  Joel Bernstein is a 34 y.o. female. HPI   Last here 10/30/18. Pt is here for routine care.     BP is 119/80      Wt today is 256 lbs --up 10 lbs x lov   Discussed pregnancy safe foods      Reviewed labs 7/18  Pt will get labs per gyn      Pt follows with Dr Mary Pabon (GI)  Last visit in 8/18  Pt took zantac BID for heartburn has not been taking this as often recently        Lov, ordered zoloft 50mg for anxiety  Pt has now stopped this since becoming pregnant   Doing well not anxious not any worse happy w/o meds     Lov, Pt c/o MCCLURE x 8 years  She believes these are menstrual migraines, now resolved     Pt follows with Dr Miya Calvo (gyn)   pt is currently 11 weeks pregnant   Due 2/19/20    Pt is taking prenatal vitamins     Pt is currently taking magnesium     Discussed speaking to gyn about any vitamins      PREVENTIVE:  Colonoscopy: not yet needed  Pap: [facility], 07/3/19   Mammogram: not yet needed  Dexa: not yet needed  Tdap: 07/30/18  Pneumovax: not yet needed  Shingrix: not yet needed  Flu shot: Fall 2018  Eye exam: 3/18  Lipids: 7/18 LDL 64       Patient Active Problem List    Diagnosis Date Noted    Gastroesophageal reflux disease without esophagitis 10/30/2018    Anxiety 10/30/2018    Obesity, morbid ([address] 07/30/2018     Current Outpatient Medications   Medication Sig Dispense Refill    cetirizine (ZYRTEC) 10 mg tablet Take  by mouth.  OTHER Low estrogen birth control      magnesium 250 mg tab Take  by mouth.  Biotin 2,500 mcg cap Take  by mouth.  cholecalciferol (VITAMIN D3) 1,000 unit tablet Take 1,000 Units by mouth daily.  sertraline (ZOLOFT) 50 mg tablet TAKE 1 TABLET BY MOUTH EVERY DAY 30 Tab 0    JUNEL FE 1/20, 28, 1 mg-20 mcg (21)/75 mg (7) tab TK 1 T PO D  12    omeprazole (PRILOSEC) 20 mg capsule Take 1 Cap by mouth daily. 30 Cap 8    raNITIdine (ZANTAC 75) 75 mg tablet Take 75 mg by mouth daily.        Past Surgical History:   Procedure Laterality Date    HX WISDOM TEETH EXTRACTION      UPPER GI ENDOSCOPY,BIOPSY  5/8/2018           Lab Results   Component Value Date/Time    WBC 7.1 07/30/2018 10:12 AM    HGB 12.8 07/30/2018 10:12 AM    HCT 38.6 07/30/2018 10:12 AM    PLATELET 385 60/85/8158 10:12 AM    MCV 88 07/30/2018 10:12 AM     Lab Results   Component Value Date/Time    Cholesterol, total 164 07/30/2018 10:12 AM    HDL Cholesterol 43 07/30/2018 10:12 AM    LDL, calculated 64 07/30/2018 10:12 AM    Triglyceride 283 (H) 07/30/2018 10:12 AM     Lab Results   Component Value Date/Time    GFR est non- 07/30/2018 10:12 AM    GFR est  07/30/2018 10:12 AM    Creatinine 0.65 07/30/2018 10:12 AM    BUN 12 07/30/2018 10:12 AM    Sodium 141 07/30/2018 10:12 AM    Potassium 4.1 07/30/2018 10:12 AM    Chloride 103 07/30/2018 10:12 AM    CO2 23 07/30/2018 10:12 AM        Review of Systems   Respiratory: Negative for shortness of breath. Cardiovascular: Negative for chest pain. Physical Exam   Constitutional: She is oriented to person, place, and time. She appears well-developed and well-nourished. No distress. HENT:   Head: Normocephalic and atraumatic. Mouth/Throat: Oropharynx is clear and moist. No oropharyngeal exudate. Eyes: Conjunctivae and EOM are normal. Right eye exhibits no discharge. Left eye exhibits no discharge. Neck: Normal range of motion. Neck supple. No carotid bruits    Cardiovascular: Normal rate, regular rhythm and normal heart sounds. Exam reveals no gallop and no friction rub. No murmur heard. Pulmonary/Chest: Effort normal and breath sounds normal. No respiratory distress. She has no wheezes. She has no rales. She exhibits no tenderness. Abdominal: Soft. She exhibits no distension and no mass. There is no tenderness. There is no rebound and no guarding. Musculoskeletal: Normal range of motion. She exhibits no edema, tenderness or deformity. Lymphadenopathy:     She has no cervical adenopathy. Neurological: She is alert and oriented to person, place, and time. Coordination normal.   Skin: Skin is warm and dry. No rash noted. She is not diaphoretic. No erythema. No pallor. Psychiatric: She has a normal mood and affect. Her behavior is normal.       ASSESSMENT and PLAN    ICD-10-CM ICD-9-CM    1. Physical exam, annual          Pt is currently pregnant and following routinely with ob, discussed wt loss, she is focusing on healthy diet at this time, flu shot in the fall, tdap utd, labs per ob, will not order any labs today  Z00.00 V70.0    2. Obesity, morbid ([facility] [address]          Pts wt had improved lov, up a little bit today, now pregnant, work on healthy diet  E66.01 278.01    3. Anxiety              Doing well w/o zoloft, continue off meds  F41.9 300.00     Depression screen reviewed and negative. Scribed by Maria Wesley of [address] [place] [address] [address], as dictated by Dr. Anand Pérez. Current diagnosis and concerns discussed with pt at length. Pt understands risks and benefits or current treatment plan and medications, and accepts the treatment and medication with any possible risks. Pt asks appropriate questions, which were answered. Pt was instructed to call with any concerns or problems. I have reviewed the note documented by the scribe. The services provided are my own.   The documentation is accurate

## 2019-07-30 NOTE — PATIENT INSTRUCTIONS
Office Policies    Phone calls/patient messages:            Please allow up to 24 hours for someone in the office to contact you about your call or message. Be mindful your provider may be out of the office or your message may require further review. We encourage you to use [employer] for your messages as this is a faster, more efficient way to communicate with our office                         Medication Refills:            Prescription medications require 48-72 business hours to process. We encourage you to use [employer] for your refills. For controlled medications: Please allow 72 business hours to process. Certain medications may require you to  a written prescription at our office. NO narcotic/controlled medications will be prescribed after 4pm Monday through Friday or on weekends              Form/Paperwork Completion:            Please note a $25 fee may incur for all paperwork for completed by our providers. We ask that you allow 7-10 business days. Pre-payment is due prior to picking up/faxing the completed form. You may also download your forms to [employer] to have your doctor print off.

## 2019-08-14 ENCOUNTER — ANESTHESIA EVENT (OUTPATIENT)
Dept: SURGERY | Age: 29
End: 2019-08-14
Payer: COMMERCIAL

## 2019-08-14 NOTE — PERIOP NOTES
[facility]  Ambulatory Surgery Unit  Pre-operative Instructions    Surgery/Procedure Date 08/15/19     Tentative Arrival Time 2708      1. On the day of your surgery/procedure, please report to the Ambulatory Surgery Unit Registration Desk and sign in at your designated time. The Ambulatory Surgery Unit is located in [facility] on the [facility] side of the [facility] across from the [address] [place] [address]. Please have all of your health insurance cards and a photo ID. 2. You must have someone with you to drive you home, as you should not drive a car for 24 hours following anesthesia. Please make arrangements for a responsible adult friend or family member to stay with you for at least the first 24 hours after your surgery. 3. Do not have anything to eat or drink (including water, gum, mints, coffee, juice) after 11:59 PM  08/14/19. This may not apply to medications prescribed by your physician. (Please note below the special instructions with medications to take the morning of surgery, if applicable.)    4. We recommend you do not drink any alcoholic beverages for 24 hours before and after your surgery. 5. Contact your surgeons office for instructions on the following medications: non-steroidal anti-inflammatory drugs (i.e. Advil, Aleve), vitamins, and supplements. (Some surgeons will want you to stop these medications prior to surgery and others may allow you to take them)   **If you are currently taking Plavix, Coumadin, Aspirin and/or other blood-thinning agents, contact your surgeon for instructions. ** Your surgeon will partner with the physician prescribing these medications to determine if it is safe to stop or if you need to continue taking. Please do not stop taking these medications without instructions from your surgeon.     6. In an effort to help prevent surgical site infection, we ask that you shower with an anti-bacterial soap (i.e. Dial/Safeguard, or the soap provided to you at your preadmission testing appointment) for 3 days prior to and on the morning of surgery, using a fresh towel after each shower. (Please begin this process with fresh bed linens.) Do not apply any lotions, powders, or deodorants after the shower on the day of your procedure. If applicable, please do not shave the operative site for 48 hours prior to surgery. 7. Wear comfortable clothes. Wear glasses instead of contacts. Do not bring any jewelry or money (other than copays or fees as instructed). Do not wear make-up, particularly mascara, the morning of your surgery. Do not wear nail polish, particularly if you are having foot /hand surgery. Wear your hair loose or down, no ponytails, buns, charles pins or clips. All body piercings must be removed. 8. You should understand that if you do not follow these instructions your surgery may be cancelled. If your physical condition changes (i.e. fever, cold or flu) please contact your surgeon as soon as possible. 9. It is important that you be on time. If a situation occurs where you may be late, or if you have any questions or problems, please call [phone].    10. Your surgery time may be subject to change. You will receive a phone call the day prior to surgery to confirm your arrival time. 11. Pediatric patients: please bring a change of clothes, diapers, bottle/sippy cup, pacifier, etc.      Special Instructions: Take all medications and inhalers, as prescribed, on the morning of surgery with a sip of water EXCEPT: n/a      I understand a pre-operative phone call will be made to verify my surgery time. In the event that I am not available, I give permission for a message to be left on my answering service and/or with another person?       Yes         ___________________      ___________________      ________________  (Signature of Patient)          (Witness)                   (Date and Time)

## 2019-08-15 ENCOUNTER — HOSPITAL ENCOUNTER (OUTPATIENT)
Age: 29
Setting detail: OUTPATIENT SURGERY
Discharge: HOME | End: 2019-08-15
Attending: OBSTETRICS & GYNECOLOGY | Admitting: OBSTETRICS & GYNECOLOGY
Payer: COMMERCIAL

## 2019-08-15 ENCOUNTER — ANESTHESIA (OUTPATIENT)
Dept: SURGERY | Age: 29
End: 2019-08-15
Payer: COMMERCIAL

## 2019-08-15 VITALS
WEIGHT: 253.6 LBS | HEART RATE: 68 BPM | SYSTOLIC BLOOD PRESSURE: 116 MMHG | OXYGEN SATURATION: 98 % | TEMPERATURE: 99.1 F | HEIGHT: 65 IN | RESPIRATION RATE: 14 BRPM | BODY MASS INDEX: 42.25 KG/M2 | DIASTOLIC BLOOD PRESSURE: 90 MMHG

## 2019-08-15 DIAGNOSIS — G89.18 POST-OP PAIN: Primary | ICD-10-CM

## 2019-08-15 PROCEDURE — 74011250637 HC RX REV CODE- 250/637: Performed by: OBSTETRICS & GYNECOLOGY

## 2019-08-15 PROCEDURE — 77030003666 HC NDL SPINAL BD -A: Performed by: OBSTETRICS & GYNECOLOGY

## 2019-08-15 PROCEDURE — 77030008578 HC TBNG UTER SUC BUSS -A: Performed by: OBSTETRICS & GYNECOLOGY

## 2019-08-15 PROCEDURE — 77030030437 HC FLTR UTER VAC OCOA -A: Performed by: OBSTETRICS & GYNECOLOGY

## 2019-08-15 PROCEDURE — 74011250636 HC RX REV CODE- 250/636: Performed by: ANESTHESIOLOGY

## 2019-08-15 PROCEDURE — 76030000000 HC AMB SURG OR TIME 0.5 TO 1: Performed by: OBSTETRICS & GYNECOLOGY

## 2019-08-15 PROCEDURE — 74011250636 HC RX REV CODE- 250/636: Performed by: NURSE ANESTHETIST, CERTIFIED REGISTERED

## 2019-08-15 PROCEDURE — 76060000061 HC AMB SURG ANES 0.5 TO 1 HR: Performed by: OBSTETRICS & GYNECOLOGY

## 2019-08-15 PROCEDURE — 77030021352 HC CBL LD SYS DISP COVD -B: Performed by: OBSTETRICS & GYNECOLOGY

## 2019-08-15 PROCEDURE — 77030020164: Performed by: OBSTETRICS & GYNECOLOGY

## 2019-08-15 PROCEDURE — 77030018836 HC SOL IRR NACL ICUM -A: Performed by: OBSTETRICS & GYNECOLOGY

## 2019-08-15 PROCEDURE — 76210000050 HC AMBSU PH II REC 0.5 TO 1 HR: Performed by: OBSTETRICS & GYNECOLOGY

## 2019-08-15 PROCEDURE — 88305 TISSUE EXAM BY PATHOLOGIST: CPT

## 2019-08-15 PROCEDURE — 88377 M/PHMTRC ALYS ISHQUANT/SEMIQ: CPT

## 2019-08-15 PROCEDURE — 77030020255 HC SOL INJ LR 1000ML BG: Performed by: OBSTETRICS & GYNECOLOGY

## 2019-08-15 PROCEDURE — 74011000250 HC RX REV CODE- 250: Performed by: OBSTETRICS & GYNECOLOGY

## 2019-08-15 PROCEDURE — 76210000040 HC AMBSU PH I REC FIRST 0.5 HR: Performed by: OBSTETRICS & GYNECOLOGY

## 2019-08-15 RX ORDER — LIDOCAINE HYDROCHLORIDE 10 MG/ML
0.1 INJECTION, SOLUTION EPIDURAL; INFILTRATION; INTRACAUDAL; PERINEURAL AS NEEDED
Status: DISCONTINUED | OUTPATIENT
Start: 2019-08-15 | End: 2019-08-15 | Stop reason: HOSPADM

## 2019-08-15 RX ORDER — MIDAZOLAM HYDROCHLORIDE 1 MG/ML
INJECTION, SOLUTION INTRAMUSCULAR; INTRAVENOUS AS NEEDED
Status: DISCONTINUED | OUTPATIENT
Start: 2019-08-15 | End: 2019-08-15 | Stop reason: HOSPADM

## 2019-08-15 RX ORDER — FENTANYL CITRATE 50 UG/ML
25 INJECTION, SOLUTION INTRAMUSCULAR; INTRAVENOUS
Status: DISCONTINUED | OUTPATIENT
Start: 2019-08-15 | End: 2019-08-15 | Stop reason: HOSPADM

## 2019-08-15 RX ORDER — SODIUM CHLORIDE 0.9 % (FLUSH) 0.9 %
5-40 SYRINGE (ML) INJECTION AS NEEDED
Status: DISCONTINUED | OUTPATIENT
Start: 2019-08-15 | End: 2019-08-15 | Stop reason: HOSPADM

## 2019-08-15 RX ORDER — ONDANSETRON 2 MG/ML
4 INJECTION INTRAMUSCULAR; INTRAVENOUS AS NEEDED
Status: DISCONTINUED | OUTPATIENT
Start: 2019-08-15 | End: 2019-08-15 | Stop reason: HOSPADM

## 2019-08-15 RX ORDER — OXYCODONE AND ACETAMINOPHEN 5; 325 MG/1; MG/1
1 TABLET ORAL
Qty: 6 TAB | Refills: 0 | Status: SHIPPED | OUTPATIENT
Start: 2019-08-15 | End: 2019-08-18

## 2019-08-15 RX ORDER — DOXYCYCLINE HYCLATE 100 MG
100 TABLET ORAL ONCE
Status: COMPLETED | OUTPATIENT
Start: 2019-08-15 | End: 2019-08-15

## 2019-08-15 RX ORDER — ONDANSETRON 2 MG/ML
INJECTION INTRAMUSCULAR; INTRAVENOUS AS NEEDED
Status: DISCONTINUED | OUTPATIENT
Start: 2019-08-15 | End: 2019-08-15 | Stop reason: HOSPADM

## 2019-08-15 RX ORDER — IBUPROFEN 800 MG/1
800 TABLET ORAL
Qty: 30 TAB | Refills: 0 | Status: SHIPPED | OUTPATIENT
Start: 2019-08-15 | End: 2020-03-25

## 2019-08-15 RX ORDER — SODIUM CHLORIDE, SODIUM LACTATE, POTASSIUM CHLORIDE, CALCIUM CHLORIDE 600; 310; 30; 20 MG/100ML; MG/100ML; MG/100ML; MG/100ML
25 INJECTION, SOLUTION INTRAVENOUS CONTINUOUS
Status: DISCONTINUED | OUTPATIENT
Start: 2019-08-15 | End: 2019-08-15 | Stop reason: HOSPADM

## 2019-08-15 RX ORDER — MORPHINE SULFATE 10 MG/ML
2 INJECTION, SOLUTION INTRAMUSCULAR; INTRAVENOUS
Status: DISCONTINUED | OUTPATIENT
Start: 2019-08-15 | End: 2019-08-15 | Stop reason: HOSPADM

## 2019-08-15 RX ORDER — SILVER NITRATE 38.21; 12.74 MG/1; MG/1
STICK TOPICAL AS NEEDED
Status: DISCONTINUED | OUTPATIENT
Start: 2019-08-15 | End: 2019-08-15 | Stop reason: HOSPADM

## 2019-08-15 RX ORDER — HYDROMORPHONE HYDROCHLORIDE 1 MG/ML
.2-.5 INJECTION, SOLUTION INTRAMUSCULAR; INTRAVENOUS; SUBCUTANEOUS ONCE
Status: DISCONTINUED | OUTPATIENT
Start: 2019-08-15 | End: 2019-08-15 | Stop reason: HOSPADM

## 2019-08-15 RX ORDER — DEXAMETHASONE SODIUM PHOSPHATE 4 MG/ML
INJECTION, SOLUTION INTRA-ARTICULAR; INTRALESIONAL; INTRAMUSCULAR; INTRAVENOUS; SOFT TISSUE AS NEEDED
Status: DISCONTINUED | OUTPATIENT
Start: 2019-08-15 | End: 2019-08-15 | Stop reason: HOSPADM

## 2019-08-15 RX ORDER — LIDOCAINE HYDROCHLORIDE 20 MG/ML
INJECTION, SOLUTION EPIDURAL; INFILTRATION; INTRACAUDAL; PERINEURAL AS NEEDED
Status: DISCONTINUED | OUTPATIENT
Start: 2019-08-15 | End: 2019-08-15 | Stop reason: HOSPADM

## 2019-08-15 RX ORDER — SODIUM CHLORIDE 0.9 % (FLUSH) 0.9 %
5-40 SYRINGE (ML) INJECTION EVERY 8 HOURS
Status: DISCONTINUED | OUTPATIENT
Start: 2019-08-15 | End: 2019-08-15 | Stop reason: HOSPADM

## 2019-08-15 RX ORDER — LIDOCAINE HYDROCHLORIDE 10 MG/ML
20 INJECTION, SOLUTION EPIDURAL; INFILTRATION; INTRACAUDAL; PERINEURAL ONCE
Status: DISCONTINUED | OUTPATIENT
Start: 2019-08-15 | End: 2019-08-15 | Stop reason: HOSPADM

## 2019-08-15 RX ORDER — DIPHENHYDRAMINE HYDROCHLORIDE 50 MG/ML
12.5 INJECTION, SOLUTION INTRAMUSCULAR; INTRAVENOUS AS NEEDED
Status: DISCONTINUED | OUTPATIENT
Start: 2019-08-15 | End: 2019-08-15 | Stop reason: HOSPADM

## 2019-08-15 RX ORDER — KETOROLAC TROMETHAMINE 30 MG/ML
INJECTION, SOLUTION INTRAMUSCULAR; INTRAVENOUS AS NEEDED
Status: DISCONTINUED | OUTPATIENT
Start: 2019-08-15 | End: 2019-08-15 | Stop reason: HOSPADM

## 2019-08-15 RX ORDER — PROPOFOL 10 MG/ML
INJECTION, EMULSION INTRAVENOUS AS NEEDED
Status: DISCONTINUED | OUTPATIENT
Start: 2019-08-15 | End: 2019-08-15 | Stop reason: HOSPADM

## 2019-08-15 RX ORDER — DOXYCYCLINE HYCLATE 100 MG
200 TABLET ORAL ONCE
Status: DISCONTINUED | OUTPATIENT
Start: 2019-08-15 | End: 2019-08-15 | Stop reason: HOSPADM

## 2019-08-15 RX ORDER — PROPOFOL 10 MG/ML
INJECTION, EMULSION INTRAVENOUS
Status: DISCONTINUED | OUTPATIENT
Start: 2019-08-15 | End: 2019-08-15 | Stop reason: HOSPADM

## 2019-08-15 RX ORDER — OXYCODONE AND ACETAMINOPHEN 5; 325 MG/1; MG/1
1 TABLET ORAL
Status: DISCONTINUED | OUTPATIENT
Start: 2019-08-15 | End: 2019-08-15 | Stop reason: HOSPADM

## 2019-08-15 RX ORDER — FENTANYL CITRATE 50 UG/ML
INJECTION, SOLUTION INTRAMUSCULAR; INTRAVENOUS AS NEEDED
Status: DISCONTINUED | OUTPATIENT
Start: 2019-08-15 | End: 2019-08-15 | Stop reason: HOSPADM

## 2019-08-15 RX ADMIN — PROPOFOL 100 MG: 10 INJECTION, EMULSION INTRAVENOUS at 12:37

## 2019-08-15 RX ADMIN — FENTANYL CITRATE 25 MCG: 50 INJECTION, SOLUTION INTRAMUSCULAR; INTRAVENOUS at 12:37

## 2019-08-15 RX ADMIN — DEXAMETHASONE SODIUM PHOSPHATE 8 MG: 4 INJECTION, SOLUTION INTRAMUSCULAR; INTRAVENOUS at 12:57

## 2019-08-15 RX ADMIN — DOXYCYCLINE HYCLATE 100 MG: 100 TABLET, COATED ORAL at 11:20

## 2019-08-15 RX ADMIN — FENTANYL CITRATE 25 MCG: 50 INJECTION, SOLUTION INTRAMUSCULAR; INTRAVENOUS at 12:40

## 2019-08-15 RX ADMIN — FENTANYL CITRATE 25 MCG: 50 INJECTION, SOLUTION INTRAMUSCULAR; INTRAVENOUS at 13:01

## 2019-08-15 RX ADMIN — LIDOCAINE HYDROCHLORIDE 40 MG: 20 INJECTION, SOLUTION EPIDURAL; INFILTRATION; INTRACAUDAL; PERINEURAL at 12:37

## 2019-08-15 RX ADMIN — SODIUM CHLORIDE, POTASSIUM CHLORIDE, SODIUM LACTATE AND CALCIUM CHLORIDE 25 ML/HR: 600; 310; 30; 20 INJECTION, SOLUTION INTRAVENOUS at 11:23

## 2019-08-15 RX ADMIN — PROPOFOL 100 MCG/KG/MIN: 10 INJECTION, EMULSION INTRAVENOUS at 12:37

## 2019-08-15 RX ADMIN — KETOROLAC TROMETHAMINE 30 MG: 30 INJECTION, SOLUTION INTRAMUSCULAR; INTRAVENOUS at 12:59

## 2019-08-15 RX ADMIN — MIDAZOLAM HYDROCHLORIDE 4 MG: 1 INJECTION, SOLUTION INTRAMUSCULAR; INTRAVENOUS at 12:32

## 2019-08-15 RX ADMIN — FENTANYL CITRATE 25 MCG: 50 INJECTION, SOLUTION INTRAMUSCULAR; INTRAVENOUS at 12:51

## 2019-08-15 RX ADMIN — ONDANSETRON HYDROCHLORIDE 4 MG: 2 INJECTION, SOLUTION INTRAMUSCULAR; INTRAVENOUS at 12:57

## 2019-08-15 NOTE — H&P
Gynecology History and Physical    Name: Eva Titus MRN: [id number] SSN: [id number]    YOB: 1990  Age: 34 y.o. Sex: female       Subjective:      Chief complaint:  Missed     Suellen Casanova is a 34 y.o. G1 with missed ab at [facility]. Was seen  for her NT ultrasound and US showed 7 6/7 week IUP with no FCA, blood type A pos. She was counseled on options and opted for surgical management. She is admitted for Procedure(s) (LRB):  DILATATION AND CURETTAGE WITH SUCTION (N/A). OB History        1    Para        Term                AB        Living           SAB        TAB        Ectopic        Molar        Multiple        Live Births                  Past Medical History:   Diagnosis Date    Anxiety     GERD (gastroesophageal reflux disease)      Past Surgical History:   Procedure Laterality Date    HX TONSILLECTOMY      HX WISDOM TEETH EXTRACTION      UPPER GI ENDOSCOPY,BIOPSY  2018          Social History     Occupational History    Not on file   Tobacco Use    Smoking status: Never Smoker    Smokeless tobacco: Never Used   Substance and Sexual Activity    Alcohol use: Not Currently     Comment: social    Drug use: No    Sexual activity: Yes     Partners: Male     Birth control/protection: Pill     Family History   Problem Relation Age of Onset    Cancer Mother         rcc        No Known Allergies  Prior to Admission medications    Medication Sig Start Date End Date Taking? Authorizing Provider   [id number] raven-folic-dha (PRENATAL DHA+COMPLETE PRENATAL) [id number] mg-mcg-mg cmpk Take 1 Tab by mouth. Yes Provider, Historical   cetirizine (ZYRTEC) 10 mg tablet Take  by mouth. Yes Provider, Historical   magnesium 250 mg tab Take  by mouth. Yes Provider, Historical   Biotin 2,500 mcg cap Take  by mouth.    Yes Provider, Historical        Review of Systems:  A comprehensive review of systems was negative except for that written in the History of Present Illness. Objective:     Vitals:    08/14/19 1442 08/15/19 1109   BP:  123/82   Pulse:  100   Resp:  18   Temp:  99.2 °F (37.3 °C)   SpO2:  99%   Weight: 113.4 kg (250 lb) 115 kg (253 lb 9.6 oz)   Height: 5' 5\" (1.651 m) 5' 5\" (1.651 m)       Physical Exam:  External genitalia: no lesions, no rash, no erythema, no genital warts  Vagina: moist mucosa, non-erythematous mucosa, no discharge, no mass, no tenderness, no cystocele, no rectocele  Cervix: no discharge, no inflammation, no cervical lesion, no cervical motion tenderness  Uterus: no mass, non-tender (8 week size)  Adnexae: no adnexal mass, no adnexal tenderness  Bladder and Urethra: bladder non distended, no meatus lesion or inflammation, no urethral discharge, no urethral mass    Assessment:     35 yo G1 with 7wk missed ab      Plan:     Procedure(s) (LRB):  DILATATION AND CURETTAGE WITH SUCTION (N/A)  Discussed the risks of surgery including the risks of bleeding, infection, deep vein thrombosis, and surgical injuries to internal organs including but not limited to the bowels, bladder, rectum, and female reproductive organs. The patient understands the risks; any and all questions were answered to the patient's satisfaction. Blood type is A pos. Plan samra-op doxycyline.

## 2019-08-15 NOTE — ANESTHESIA POSTPROCEDURE EVALUATION
Procedure(s):  DILATATION AND CURETTAGE WITH SUCTION.     total IV anesthesia, MAC    Anesthesia Post Evaluation      Multimodal analgesia: multimodal analgesia used between 6 hours prior to anesthesia start to PACU discharge  Patient location during evaluation: bedside  Patient participation: complete - patient participated  Level of consciousness: awake and alert  Pain score: 0  Airway patency: patent  Anesthetic complications: no  Cardiovascular status: acceptable  Respiratory status: acceptable  Hydration status: acceptable  Post anesthesia nausea and vomiting:  none      Vitals Value Taken Time   /81 8/15/2019  1:20 PM   Temp 37.3 °C (99.1 °F) 8/15/2019  1:19 PM   Pulse 71 8/15/2019  1:20 PM   Resp 19 8/15/2019  1:20 PM   SpO2 96 % 8/15/2019  1:24 PM

## 2019-08-15 NOTE — PERIOP NOTES
Mauricio Cobb  1990  [id number]    Situation:  Verbal report given from: WU Griffith RN and AAlysha [employer] [place] CRNA  Procedure: Procedure(s):  DILATATION AND CURETTAGE WITH SUCTION    Background:    Preoperative diagnosis: Missed  [O02.1]    Postoperative diagnosis: Missed  [O02.1]    :  Dr. Wing Irvin    Assistant(s): Circ-1: Angle Kennedy RN  Surg Asst-1: Gabino Patino RN    Specimens:   ID Type Source Tests Collected by Time Destination   1 : PRODUCTS OF CONCEPTION  Fresh Products of Conception  Valeriy Millan MD 8/15/2019 1223 Pathology       Assessment:  Intra-procedure medications         Anesthesia gave intra-procedure sedation and medications, see anesthesia flow sheet     Intravenous fluids: LR@ KVO     Vital signs stable       Recommendation:    Permission to share finding with  : yes

## 2019-08-15 NOTE — PERIOP NOTES
Pt awake, sipping ginger ale,  brought to bedside. Pt denies discomfort  1355 d/C instructions reviewed with pt/spouse and he signed for them. 1400 Discharged to home via/wc,accompanied to car per RN. Skin warm and dry, awake and alert. Respirations even, unlabored. Pt and family members questions and concerns addressed prior to discharge. All belongings with pt.

## 2019-08-15 NOTE — ANESTHESIA PREPROCEDURE EVALUATION
Anesthetic History   No history of anesthetic complications            Review of Systems / Medical History  Patient summary reviewed, nursing notes reviewed and pertinent labs reviewed    Pulmonary  Within defined limits                 Neuro/Psych   Within defined limits           Cardiovascular  Within defined limits                Exercise tolerance: >4 METS     GI/Hepatic/Renal     GERD: well controlled           Endo/Other        Morbid obesity     Other Findings   Comments: IUP at apprx 12-13 weeks         Physical Exam    Airway  Mallampati: II  TM Distance: 4 - 6 cm  Neck ROM: normal range of motion   Mouth opening: Normal     Cardiovascular    Rhythm: regular  Rate: normal         Dental  No notable dental hx       Pulmonary  Breath sounds clear to auscultation               Abdominal  GI exam deferred       Other Findings            Anesthetic Plan    ASA: 2  Anesthesia type: total IV anesthesia and MAC          Induction: Intravenous  Anesthetic plan and risks discussed with: Patient

## 2019-08-15 NOTE — OP NOTES
SUCTION CURETTAGE FULL OP NOTE    Christie Jorge  [id number]  1990      DATE OF PROCEDURE:  8/15/2019    PREOPERATIVE DIAGNOSIS:  Missed  [O02.1]    POSTOPERATIVE DIAGNOSIS:  Missed  [O02.1]    PROCEDURE: Procedure(s):  DILATATION AND CURETTAGE WITH SUCTION     SURGEON:  Brian Fernandez MD    ASSISTANT: None    ANESTHESIA:monitored anesthesia care    EBL: Minimal    SPECIMENS: Products of conception    FINDINGS: 8 weeks size uterus. Appropriate amount of POC on suction D&C. Good cri at end of procedure. PROCEDURE: Patient was placed on the operating table in the supine position and after induction of anesthesia she was placed in the dorsal lithotomy position and prepped and draped in the usual fashion for vaginal surgery. Cervix was exposed with a Graves vaginal speculum and grasped with a single-tooth tenaculum. The cervix was sequentially dilated easily to 8mm. A curved 8 suction curette device was then introduced into the endometrial cavity. Thorough suction curettage followed by sharp curettage with a large curette followed again by suction curettage was performed until the suction returned no further clot or products of conception. Adequate curettage was felt to be obtained. The uterus was massaged. Hemostasis appeared normal, Instruments were removed. The tenaculum site was made hemostatic with silver nitrate. The patient went to the recovery room in satisfactory condition. All counts were correct times two. Of note blood type was RH pos.

## 2019-08-15 NOTE — DISCHARGE INSTRUCTIONS
After Care Instructions For Your D&C      1. You may resume your usual diet once the nausea resolves. Initially, try sips of warm fluids and a bland diet. 2. Avoid heavy lifting and straining. Gradually increase your activity. First, try walking and doing light activity around the house. Resume your normal habits if no significant discomfort or bleeding develops. Most women can return to work within one to four days after this procedure. 3. You may take showers. Avoid using a tub bath, swimming pool or hot tub until after your check-up. 4. Do not place anything in your vagina until after your postoperative visit. Do not   douche, use tampons, or have intercourse because this may cause bleeding and   infection. 5. You may initially experience a heavy bloody discharge. This should not be more than your menstrual flow. Over the next several days, the flow should steadily decrease. 6. Typically following the procedure, there is little or no pain. You may feel cramps in your lower abdomen. Tylenol may relieve mild cramping. If pain medication does not improve your symptoms, you should contact your physician. 7. Contact the office if you have excessive bleeding (saturating a pad an hour for two hours or passing large clots). It is also necessary to speak with your physician if you develop chills, a temperature greater than 100.4, difficulty voiding or burning on urination. 8. Your physician may want to see you in the office after your D&C. Please call for an appointment if this has not already been arranged. Our office phone number is [phone]. If appropriate, the microscopic results from your procedure will be discussed at this follow-up visit.        >>>You received Toradol during your surgery.  You may not take any form of NSAIDS (non steroidal anti inflammatory drugs) such as Advil, Ibuprofen, Aleve, Motrin until 7 pm  .<<<      DO NOT TAKE TYLENOL/ACETAMINOPHEN WITH PERCOCET, [place] Alexandra, [address] [address] [facility]. TAKE NARCOTIC PAIN MEDICATIONS WITH FOOD     Narcotics tend to be constipating, we suggest taking a stool softener such as Colace or Miralax (follow package instructions). DO NOT DRIVE WHILE TAKING NARCOTIC PAIN MEDICATIONS. DO NOT TAKE SLEEPING MEDICATIONS OR ANTIANXIETY MEDICATIONS WHILE TAKING NARCOTIC PAIN MEDICATIONS,  ESPECIALLY THE NIGHT OF ANESTHESIA! CPAP PATIENTS BE SURE TO WEAR MACHINE WHENEVER NAPPING OR SLEEPING! DISCHARGE SUMMARY from Nurse    The following personal items collected during your admission are returned to you:   Dental Appliance: Dental Appliances: None  Vision: Visual Aid: None  Hearing Aid:    Jewelry: Jewelry: None  Clothing: Clothing: Other (comment)(clohting in belongings bag)  Other Valuables: Other Valuables: None  Valuables sent to safe:        PATIENT INSTRUCTIONS:    After General Anesthesia or Intravenous Sedation, for 24 hours or while taking prescription Narcotics:        Someone should be with you for the next 24 hours. For your own safety, a responsible adult must drive you home. · Limit your activities  · Recommended activity: Rest today, up with assistance today. Do not climb stairs or shower unattended for the next 24 hours. · Please start with a soft bland diet and advance as tolerated (no nausea) to regular diet. · If you have a sore throat you should try the following: fluids, warm salt water gargles, or throat lozenges. If it does not improve after several days please follow up with your primary physician. · Do not drive and operate hazardous machinery  · Do not make important personal or business decisions  · Do  not drink alcoholic beverages  · If you have not urinated within 8 hours after discharge, please contact your surgeon on call.     Report the following to your surgeon:  · Excessive pain, swelling, redness or odor of or around the surgical area  · Temperature over 100.5  · Nausea and vomiting lasting longer than 4 hours or if unable to take medications  · Any signs of decreased circulation or nerve impairment to extremity: change in color, persistent  numbness, tingling, coldness or increase pain      · You will receive a Post Operative Call from one of the Recovery Room Nurses on the day after your surgery to check on you. It is very important for us to know how you are recovering after your surgery. If you have an issue or need to speak with someone, please call your surgeon, do not wait for the post operative call. · You may receive an e-mail or letter in the mail from [employer] regarding your experience with us in the Ambulatory Surgery Unit. Your feedback is valuable to us and we appreciate your participation in the survey. · If the above instructions are not adequate or you are having problems after your surgery, call the physician at their office number. · We wish you a speedy recovery ? What to do at Home:      *  Please give a list of your current medications to your Primary Care Provider. *  Please update this list whenever your medications are discontinued, doses are      changed, or new medications (including over-the-counter products) are added. *  Please carry medication information at all times in case of emergency situations. If you have not received your influenza and/or pneumococcal vaccine, please follow up with your primary care physician. The discharge information has been reviewed with the patient and caregiver. The patient and caregiver verbalized understanding.

## 2019-08-15 NOTE — INTERVAL H&P NOTE
H&P Update:  Fahad Sweet was seen and examined. History and physical has been reviewed. The patient has been examined.  There have been no significant clinical changes since the completion of the originally dated History and Physical.

## 2019-10-10 RX ORDER — SERTRALINE HYDROCHLORIDE 50 MG/1
50 TABLET, FILM COATED ORAL DAILY
Qty: 90 TAB | Refills: 1 | Status: SHIPPED | OUTPATIENT
Start: 2019-10-10 | End: 2020-03-26 | Stop reason: SDUPTHER

## 2020-03-25 RX ORDER — FAMOTIDINE 20 MG/1
20 TABLET, FILM COATED ORAL 2 TIMES DAILY
COMMUNITY

## 2020-03-25 NOTE — H&P
Pre-operative Evaluation / History & Physical    Sent From: Sent To: [address]    [facility]   Phone: [phone] Fax: [phone]      Patient Information  Patient Name Fernando CHEN    1990 Age 34yo   Address [address] [place] [address] Phone H: [phone]  M: [phone]   [address] [place] [address] ([facility])  ID: [id number]  Group: [id number]  Policy Reyes: Gerson Sharma   Secondary Insurance None recorded. Pre-Op Visit and Medical History  Chief Complaint GYN problem    early ob problem/ultrasound   History of Present Illness rm#4 Patient presents for ob/us-MAB in ultrasound today. Patient seen last week with US- 6 4/7 week IUP with FHR=92  US today- 6 5/7 week MAB. blood type A pos  h/o partial molar pregnancy 2019- [facility] followed to 0   Past Medical History Discussed Past Medical History  Allergies (environmental/food): Y  Anxiety Disorder: Y  Depression: Y  Gastroesophageal Reflux Disease (GERD): Y   Surgical History Reviewed Surgical History     Extraction of wisdom tooth   Tonsillectomy   Care of miscarriage - 08/15/2019 - Partial molar pregnancy-D&C for 8 wk MAB   Gynecological / Obstetrical History Reviewed GYN History  Date of LMP: 2020 (Notes: pregnant). Frequency of Cycle (Q days): 28. Duration of Flow (days): 7. Flow: Moderate. Menses Monthly: Y. Menstrual Cramps: moderate. Date of Last Pap Smear: 2020 (Notes: NIL). Abnormal Pap: N. Age at Menarche: 15. HPV Vaccine: Y. Sexually Active?: Y.  STIs/STDs: N.  Current Birth Control Method: Pregnant. Endometriosis: N. Fibroids: N.   Ovarian Cyst: N.  Partial molar pregnancy 2019   Social History Discussed Social History  OB/GYN Social History  Chewing tobacco: none  Tobacco Smoking Status: Never smoker  Smokeless Tobacco Status: Never used smokeless tobacco  E-cigarette/Vape Status: Never used electronic cigarettes  Most Recent Tobacco Use Screenin2019  Marital status:  (Notes: beach wedding planned 2018)  Are you working: Yes  Occupation: physical therapy  On average, how many days per week do you engage in moderate to strenuous EXERCISE (like walking fast, running, jogging, dancing, swimming, biking, or other activities that cause a light or heavy sweat)?: 3  How often do you have a DRINK containing ALCOHOL?: Never   Family History Discussed Family History    Maternal Grandfather - Secondary malignant neoplasm of vertebral column  - met to the spine   Maternal Grandmother - Hypertensive disorder   Mother - Malignant tumor of kidney  - stage 1 kidney tumor removed       Allergies List Reviewed Allergies     NKDA      Medications Reviewed Medications     Metamucil  start 2020   filled    Pepcid 20   entered                     Prenatal 19   entered                     sertraline 50 mg tablet  Take 1 tablet(s) every day by oral route. 20   entered                     ZyrTEC 10 mg tablet  Internal Note: Entered By: Darrius Rogers MA - Team 1 SHPSigned By: Daiana Velázquez MDUncoded: NBMN: N, start 2020   filled       Review of Systems Additionally reports: Except as noted in the HPI, the review of systems is negative for General and . Vital Signs Ht:                  5 ft 5 in (165.1 cm) 2020 02:04 pm Wt:                  258 lbs (117.03 kg) 2020 02:04 pm BMI:                  42.9 2020 02:04 pm   BP:                  130/78 2020 02:06 pm          Physical Exam Patient is a 60-year-old female. Female : External genitalia: no lesions, rash, erythema, or genital warts. Vagina: no discharge, mass, tenderness, cystocele, or rectocele and moist mucosa and non-erythematous mucosa. Cervix: no cervical lesion or motion tenderness and no discharge or inflammation.  Uterus: retroverted, smooth, non-tender, no mass, and not enlarged. Adnexae: no adnexal mass or tenderness. Bladder and Urethra: no urethral discharge or mass, no meatus lesion or inflammation, and bladder non distended. Lab Results    Assessment and Plan 1. Missed miscarriage -  Condolences offered. We discussed incidence of miscarriage and I reassured her that she did not cause the miscarriage and could not have prevented it. Discussed future pregnancy concerns and her h/o partial molar pregnancy. Discussed treatment options including expectant management and typical bleeding patterns; medical management with Cytotec and typical bleeding, pain and success rates; and D&C with risks of surgery including bleeding, infection and damage to surrounding organs including nerves, vessels, bowel, bladder, ureters, uterus and risks of anesthesia and post-op pain. Recovery discussed. Given her history of molar pregnancy would recommend D&C and they agree- will schedule for Thursday.    pos  O02.1: Missed       Return to Milla Roa MD for Return OB at [facility] Office on 2020 at 07:45 AM   Davin Jay MD for Return OB at [facility] Office on 2020 at 08:00 AM   [address] [place] [address] for Office Visit at [facility] Office on 2020 at 02:15 PM   Davin Jay MD for Return OB at [facility] Office on 2020 at 07:45 AM   Current Problems (Diagnoses) Reviewed Problems     History of molar pregnancy - Onset: 2019   Obesity - Onset: 2016 - Entered By: Lavon Márquez By: Carlitos Hsu [facility] Description: Obesity (BMI > 29.99) code: 278.00         Electronically Signed by: Andrez Milian MD    _____________________________________________   Ordered/Documented by:   Visit Date: 2020

## 2020-03-25 NOTE — PERIOP NOTES
[facility]  Ambulatory Surgery Unit  Pre-operative Instructions    Surgery/Procedure Date  3/26            Tentative Arrival Time 0845      1. On the day of your surgery/procedure, please report to the Ambulatory Surgery Unit Registration Desk and sign in at your designated time. The Ambulatory Surgery Unit is located in [facility] on the [facility] side of the [facility] across from the [address] [place] [address]. Please have all of your health insurance cards and a photo ID. 2. You must have someone with you to drive you home, as you should not drive a car for 24 hours following anesthesia. Please make arrangements for a responsible adult friend or family member to stay with you for at least the first 24 hours after your surgery. 3. Do not have anything to eat or drink (including water, gum, mints, coffee, juice) after 11:59 PM  03/25. This may not apply to medications prescribed by your physician. (Please note below the special instructions with medications to take the morning of surgery, if applicable.)    4. We recommend you do not drink any alcoholic beverages for 24 hours before and after your surgery. 5. Contact your surgeons office for instructions on the following medications: non-steroidal anti-inflammatory drugs (i.e. Advil, Aleve), vitamins, and supplements. (Some surgeons will want you to stop these medications prior to surgery and others may allow you to take them)   **If you are currently taking Plavix, Coumadin, Aspirin and/or other blood-thinning agents, contact your surgeon for instructions. ** Your surgeon will partner with the physician prescribing these medications to determine if it is safe to stop or if you need to continue taking. Please do not stop taking these medications without instructions from your surgeon.     6. In an effort to help prevent surgical site infection, we ask that you shower with an anti-bacterial soap (i.e. Dial/Safeguard, or the soap provided to you at your preadmission testing appointment) for 3 days prior to and on the morning of surgery, using a fresh towel after each shower. (Please begin this process with fresh bed linens.) Do not apply any lotions, powders, or deodorants after the shower on the day of your procedure. If applicable, please do not shave the operative site for 48 hours prior to surgery. 7. Wear comfortable clothes. Wear glasses instead of contacts. Do not bring any jewelry or money (other than copays or fees as instructed). Do not wear make-up, particularly mascara, the morning of your surgery. Do not wear nail polish, particularly if you are having foot /hand surgery. Wear your hair loose or down, no ponytails, buns, charles pins or clips. All body piercings must be removed. 8. You should understand that if you do not follow these instructions your surgery may be cancelled. If your physical condition changes (i.e. fever, cold or flu) please contact your surgeon as soon as possible. 9. It is important that you be on time. If a situation occurs where you may be late, or if you have any questions or problems, please call [phone].    10. Your surgery time may be subject to change. You will receive a phone call the day prior to surgery to confirm your arrival time. 11. Pediatric patients: please bring a change of clothes, diapers, bottle/sippy cup, pacifier, etc.      Special Instructions: Take all medications and inhalers, as prescribed, on the morning of surgery with a sip of water EXCEPT: n/a      Insulin Dependent Diabetic patients: Take your diabetic medications as prescribed the day before surgery. Hold all diabetic medications the day of surgery. If you are scheduled to arrive for surgery after 8:00 AM, and your AM blood sugar is >200, please call Ambulatory Surgery. As a precaution [facility] has implemented a restricted visitation policy to limit to 1 visitor per patient.  No visitors under the age of 12 will be permitted in the Hospital. If you are ill, please call to reschedule your procedure. (For pediatric patients, 2 visitors per patient). I understand a pre-operative phone call will be made to verify my surgery time. In the event that I am not available, I give permission for a message to be left on my answering service and/or with another person?       Yes           ___________________      ___________________      ________________  (Signature of Patient)          (Witness)                   (Date and Time)

## 2020-03-26 ENCOUNTER — HOSPITAL ENCOUNTER (OUTPATIENT)
Age: 30
Setting detail: OUTPATIENT SURGERY
Discharge: HOME | End: 2020-03-26
Attending: OBSTETRICS & GYNECOLOGY | Admitting: OBSTETRICS & GYNECOLOGY
Payer: COMMERCIAL

## 2020-03-26 ENCOUNTER — ANESTHESIA EVENT (OUTPATIENT)
Dept: SURGERY | Age: 30
End: 2020-03-26
Payer: COMMERCIAL

## 2020-03-26 ENCOUNTER — ANESTHESIA (OUTPATIENT)
Dept: SURGERY | Age: 30
End: 2020-03-26
Payer: COMMERCIAL

## 2020-03-26 VITALS
BODY MASS INDEX: 42.65 KG/M2 | TEMPERATURE: 99.6 F | RESPIRATION RATE: 13 BRPM | HEART RATE: 62 BPM | HEIGHT: 65 IN | OXYGEN SATURATION: 99 % | SYSTOLIC BLOOD PRESSURE: 121 MMHG | WEIGHT: 256 LBS | DIASTOLIC BLOOD PRESSURE: 81 MMHG

## 2020-03-26 DIAGNOSIS — G89.18 POST-OP PAIN: Primary | ICD-10-CM

## 2020-03-26 PROCEDURE — 74011250636 HC RX REV CODE- 250/636

## 2020-03-26 PROCEDURE — 88305 TISSUE EXAM BY PATHOLOGIST: CPT

## 2020-03-26 PROCEDURE — 76060000073 HC AMB SURG ANES FIRST 0.5 HR: Performed by: OBSTETRICS & GYNECOLOGY

## 2020-03-26 PROCEDURE — 74011000250 HC RX REV CODE- 250: Performed by: NURSE ANESTHETIST, CERTIFIED REGISTERED

## 2020-03-26 PROCEDURE — 76210000040 HC AMBSU PH I REC FIRST 0.5 HR: Performed by: OBSTETRICS & GYNECOLOGY

## 2020-03-26 PROCEDURE — 77030018836 HC SOL IRR NACL ICUM -A: Performed by: OBSTETRICS & GYNECOLOGY

## 2020-03-26 PROCEDURE — 74011000250 HC RX REV CODE- 250: Performed by: OBSTETRICS & GYNECOLOGY

## 2020-03-26 PROCEDURE — 77030008578 HC TBNG UTER SUC BUSS -A: Performed by: OBSTETRICS & GYNECOLOGY

## 2020-03-26 PROCEDURE — 76210000046 HC AMBSU PH II REC FIRST 0.5 HR: Performed by: OBSTETRICS & GYNECOLOGY

## 2020-03-26 PROCEDURE — 76030000002 HC AMB SURG OR TIME FIRST 0.: Performed by: OBSTETRICS & GYNECOLOGY

## 2020-03-26 PROCEDURE — 77030003666 HC NDL SPINAL BD -A: Performed by: OBSTETRICS & GYNECOLOGY

## 2020-03-26 PROCEDURE — 74011250637 HC RX REV CODE- 250/637: Performed by: OBSTETRICS & GYNECOLOGY

## 2020-03-26 PROCEDURE — 74011250636 HC RX REV CODE- 250/636: Performed by: NURSE ANESTHETIST, CERTIFIED REGISTERED

## 2020-03-26 PROCEDURE — 77030030437 HC FLTR UTER VAC OCOA -A: Performed by: OBSTETRICS & GYNECOLOGY

## 2020-03-26 PROCEDURE — 74011250636 HC RX REV CODE- 250/636: Performed by: ANESTHESIOLOGY

## 2020-03-26 RX ORDER — LIDOCAINE HYDROCHLORIDE 10 MG/ML
0.1 INJECTION, SOLUTION EPIDURAL; INFILTRATION; INTRACAUDAL; PERINEURAL AS NEEDED
Status: DISCONTINUED | OUTPATIENT
Start: 2020-03-26 | End: 2020-03-26 | Stop reason: HOSPADM

## 2020-03-26 RX ORDER — LIDOCAINE HYDROCHLORIDE 10 MG/ML
INJECTION, SOLUTION EPIDURAL; INFILTRATION; INTRACAUDAL; PERINEURAL AS NEEDED
Status: DISCONTINUED | OUTPATIENT
Start: 2020-03-26 | End: 2020-03-26 | Stop reason: HOSPADM

## 2020-03-26 RX ORDER — SODIUM CHLORIDE 0.9 % (FLUSH) 0.9 %
5-40 SYRINGE (ML) INJECTION EVERY 8 HOURS
Status: DISCONTINUED | OUTPATIENT
Start: 2020-03-26 | End: 2020-03-26 | Stop reason: HOSPADM

## 2020-03-26 RX ORDER — DOXYCYCLINE HYCLATE 100 MG
200 TABLET ORAL ONCE
Status: COMPLETED | OUTPATIENT
Start: 2020-03-26 | End: 2020-03-26

## 2020-03-26 RX ORDER — SODIUM CHLORIDE 0.9 % (FLUSH) 0.9 %
5-40 SYRINGE (ML) INJECTION AS NEEDED
Status: DISCONTINUED | OUTPATIENT
Start: 2020-03-26 | End: 2020-03-26 | Stop reason: HOSPADM

## 2020-03-26 RX ORDER — ONDANSETRON 2 MG/ML
INJECTION INTRAMUSCULAR; INTRAVENOUS AS NEEDED
Status: DISCONTINUED | OUTPATIENT
Start: 2020-03-26 | End: 2020-03-26 | Stop reason: HOSPADM

## 2020-03-26 RX ORDER — FENTANYL CITRATE 50 UG/ML
INJECTION, SOLUTION INTRAMUSCULAR; INTRAVENOUS AS NEEDED
Status: DISCONTINUED | OUTPATIENT
Start: 2020-03-26 | End: 2020-03-26 | Stop reason: HOSPADM

## 2020-03-26 RX ORDER — HYDROMORPHONE HYDROCHLORIDE 1 MG/ML
0.2 INJECTION, SOLUTION INTRAMUSCULAR; INTRAVENOUS; SUBCUTANEOUS
Status: DISCONTINUED | OUTPATIENT
Start: 2020-03-26 | End: 2020-03-26 | Stop reason: HOSPADM

## 2020-03-26 RX ORDER — IBUPROFEN 800 MG/1
800 TABLET ORAL
Qty: 30 TAB | Refills: 0 | Status: ON HOLD | OUTPATIENT
Start: 2020-03-26 | End: 2021-08-14 | Stop reason: SDUPTHER

## 2020-03-26 RX ORDER — OXYCODONE AND ACETAMINOPHEN 5; 325 MG/1; MG/1
1 TABLET ORAL
Qty: 4 TAB | Refills: 0 | Status: SHIPPED | OUTPATIENT
Start: 2020-03-26 | End: 2020-03-29

## 2020-03-26 RX ORDER — SODIUM CHLORIDE, SODIUM LACTATE, POTASSIUM CHLORIDE, CALCIUM CHLORIDE 600; 310; 30; 20 MG/100ML; MG/100ML; MG/100ML; MG/100ML
25 INJECTION, SOLUTION INTRAVENOUS CONTINUOUS
Status: DISCONTINUED | OUTPATIENT
Start: 2020-03-26 | End: 2020-03-26 | Stop reason: HOSPADM

## 2020-03-26 RX ORDER — FENTANYL CITRATE 50 UG/ML
25 INJECTION, SOLUTION INTRAMUSCULAR; INTRAVENOUS
Status: DISCONTINUED | OUTPATIENT
Start: 2020-03-26 | End: 2020-03-26 | Stop reason: HOSPADM

## 2020-03-26 RX ORDER — PROPOFOL 10 MG/ML
INJECTION, EMULSION INTRAVENOUS
Status: DISCONTINUED | OUTPATIENT
Start: 2020-03-26 | End: 2020-03-26 | Stop reason: HOSPADM

## 2020-03-26 RX ORDER — SILVER NITRATE 38.21; 12.74 MG/1; MG/1
STICK TOPICAL AS NEEDED
Status: DISCONTINUED | OUTPATIENT
Start: 2020-03-26 | End: 2020-03-26 | Stop reason: HOSPADM

## 2020-03-26 RX ORDER — SERTRALINE HYDROCHLORIDE 50 MG/1
50 TABLET, FILM COATED ORAL DAILY
Qty: 90 TAB | Refills: 1 | Status: SHIPPED | OUTPATIENT
Start: 2020-03-26 | End: 2020-04-08 | Stop reason: SDUPTHER

## 2020-03-26 RX ORDER — ACETAMINOPHEN 10 MG/ML
INJECTION, SOLUTION INTRAVENOUS
Status: COMPLETED
Start: 2020-03-26 | End: 2020-03-26

## 2020-03-26 RX ORDER — MIDAZOLAM HYDROCHLORIDE 1 MG/ML
INJECTION, SOLUTION INTRAMUSCULAR; INTRAVENOUS AS NEEDED
Status: DISCONTINUED | OUTPATIENT
Start: 2020-03-26 | End: 2020-03-26 | Stop reason: HOSPADM

## 2020-03-26 RX ORDER — KETAMINE HYDROCHLORIDE 10 MG/ML
INJECTION, SOLUTION INTRAMUSCULAR; INTRAVENOUS AS NEEDED
Status: DISCONTINUED | OUTPATIENT
Start: 2020-03-26 | End: 2020-03-26 | Stop reason: HOSPADM

## 2020-03-26 RX ORDER — DIPHENHYDRAMINE HYDROCHLORIDE 50 MG/ML
12.5 INJECTION, SOLUTION INTRAMUSCULAR; INTRAVENOUS AS NEEDED
Status: DISCONTINUED | OUTPATIENT
Start: 2020-03-26 | End: 2020-03-26 | Stop reason: HOSPADM

## 2020-03-26 RX ORDER — ACETAMINOPHEN 10 MG/ML
1000 INJECTION, SOLUTION INTRAVENOUS ONCE
Status: COMPLETED | OUTPATIENT
Start: 2020-03-26 | End: 2020-03-26

## 2020-03-26 RX ADMIN — PROPOFOL 25 MCG/KG/MIN: 10 INJECTION, EMULSION INTRAVENOUS at 10:05

## 2020-03-26 RX ADMIN — MIDAZOLAM HYDROCHLORIDE 4 MG: 1 INJECTION, SOLUTION INTRAMUSCULAR; INTRAVENOUS at 10:01

## 2020-03-26 RX ADMIN — ACETAMINOPHEN 1000 MG: 10 INJECTION, SOLUTION INTRAVENOUS at 09:56

## 2020-03-26 RX ADMIN — FENTANYL CITRATE 50 MCG: 50 INJECTION, SOLUTION INTRAMUSCULAR; INTRAVENOUS at 10:01

## 2020-03-26 RX ADMIN — KETAMINE HYDROCHLORIDE 30 MG: 10 INJECTION, SOLUTION INTRAMUSCULAR; INTRAVENOUS at 10:08

## 2020-03-26 RX ADMIN — ONDANSETRON HYDROCHLORIDE 4 MG: 2 INJECTION, SOLUTION INTRAMUSCULAR; INTRAVENOUS at 10:08

## 2020-03-26 RX ADMIN — Medication 3 AMPULE: at 09:09

## 2020-03-26 RX ADMIN — SODIUM CHLORIDE, SODIUM LACTATE, POTASSIUM CHLORIDE, AND CALCIUM CHLORIDE 25 ML/HR: 600; 310; 30; 20 INJECTION, SOLUTION INTRAVENOUS at 09:09

## 2020-03-26 RX ADMIN — DOXYCYCLINE HYCLATE 200 MG: 100 TABLET, COATED ORAL at 09:09

## 2020-03-26 NOTE — ANESTHESIA PREPROCEDURE EVALUATION
Anesthetic History   No history of anesthetic complications            Review of Systems / Medical History  Patient summary reviewed, nursing notes reviewed and pertinent labs reviewed    Pulmonary  Within defined limits                 Neuro/Psych   Within defined limits           Cardiovascular  Within defined limits                Exercise tolerance: >4 METS     GI/Hepatic/Renal     GERD: well controlled           Endo/Other        Morbid obesity     Other Findings   Comments: Missed Spontaneous            Physical Exam    Airway  Mallampati: II  TM Distance: 4 - 6 cm  Neck ROM: normal range of motion   Mouth opening: Normal     Cardiovascular    Rhythm: regular  Rate: normal         Dental  No notable dental hx       Pulmonary  Breath sounds clear to auscultation               Abdominal  GI exam deferred       Other Findings            Anesthetic Plan    ASA: 2  Anesthesia type: total IV anesthesia and MAC          Induction: Intravenous  Anesthetic plan and risks discussed with: Patient

## 2020-03-26 NOTE — ANESTHESIA POSTPROCEDURE EVALUATION
Procedure(s):  SUCTION DILATATION AND CURETTAGE (CHOICE ANESTH) (ESSENTIAL). total IV anesthesia, MAC    Anesthesia Post Evaluation        Patient location during evaluation: PACU  Note status: Adequate. Level of consciousness: responsive to verbal stimuli and sleepy but conscious  Pain management: satisfactory to patient  Airway patency: patent  Anesthetic complications: no  Cardiovascular status: acceptable  Respiratory status: acceptable  Hydration status: acceptable  Comments: +Post-Anesthesia Evaluation and Assessment    Patient: Jennifer  MRN: [id number]  SSN: [id number]   YOB: 1990  Age: 34 y.o. Sex: female      Cardiovascular Function/Vital Signs    /87   Pulse 65   Temp 37.4 °C (99.4 °F)   Resp 12   Ht 5' 5\" (1.651 m)   Wt 116.1 kg (256 lb)   SpO2 100%   BMI 42.60 kg/m²     Patient is status post Procedure(s):  SUCTION DILATATION AND CURETTAGE (CHOICE ANESTH) (ESSENTIAL). Nausea/Vomiting: Controlled. Postoperative hydration reviewed and adequate. Pain:  Pain Scale 1: Numeric (0 - 10) (20 1041)  Pain Intensity 1: 3 (20 1041)   Managed. Neurological Status:   Neuro (WDL): Within Defined Limits (20 1031)   At baseline. Mental Status and Level of Consciousness: Arousable. Pulmonary Status:   O2 Device: Room air (20 1034)   Adequate oxygenation and airway patent. Complications related to anesthesia: None    Post-anesthesia assessment completed. No concerns.     Signed By: Harris Funes MD    3/26/2020  Post anesthesia nausea and vomiting:  controlled      Vitals Value Taken Time   /87 3/26/2020 10:41 AM   Temp 37.4 °C (99.4 °F) 3/26/2020 10:34 AM   Pulse 65 3/26/2020 10:41 AM   Resp 12 3/26/2020 10:41 AM   SpO2 100 % 3/26/2020 10:41 AM

## 2020-03-26 NOTE — DISCHARGE INSTRUCTIONS
After Care Instructions For Your D&C      1. You may resume your usual diet once the nausea resolves. Initially, try sips of warm fluids and a bland diet. 2. Avoid heavy lifting and straining. Gradually increase your activity. First, try walking and doing light activity around the house. Resume your normal habits if no significant discomfort or bleeding develops. Most women can return to work within one to four days after this procedure. 3. You may take showers. Avoid using a tub bath, swimming pool or hot tub until after your check-up. 4. Do not place anything in your vagina until after your postoperative visit. Do not   douche, use tampons, or have intercourse because this may cause bleeding and   infection. 5. You may initially experience a heavy bloody discharge. This should not be more than your menstrual flow. Over the next several days, the flow should steadily decrease. 6. Typically following the procedure, there is little or no pain. You may feel cramps in your lower abdomen. Tylenol may relieve mild cramping. If pain medication does not improve your symptoms, you should contact your physician. 7. Contact the office if you have excessive bleeding (saturating a pad an hour for two hours or passing large clots). It is also necessary to speak with your physician if you develop chills, a temperature greater than 100.4, difficulty voiding or burning on urination. 8. Your physician may want to see you in the office after your D&C. Please call for an appointment if this has not already been arranged. Our office phone number is [phone].  If appropriate, the microscopic results from your procedure will be discussed at this follow-up visit.     >>>You received an IV form of Tylenol 1000mg (Ofirmev) during your surgery, you may take tylenol (or pain medication containing Tylenol or Acetaminophen) in 4 hours at 2pm.<<<    DO NOT TAKE TYLENOL/ACETAMINOPHEN WITH PERCOCET, LORTAB, NORCO OR VICODEN. TAKE NARCOTIC PAIN MEDICATIONS WITH FOOD     Narcotics tend to be constipating, we suggest taking a stool softener such as Colace or Miralax (follow package instructions). DO NOT DRIVE WHILE TAKING NARCOTIC PAIN MEDICATIONS. DO NOT TAKE SLEEPING MEDICATIONS OR ANTIANXIETY MEDICATIONS WHILE TAKING NARCOTIC PAIN MEDICATIONS,  ESPECIALLY THE NIGHT OF ANESTHESIA! CPAP PATIENTS BE SURE TO WEAR MACHINE WHENEVER NAPPING OR SLEEPING! DISCHARGE SUMMARY from Nurse    The following personal items collected during your admission are returned to you:   Dental Appliance: Dental Appliances: None  Vision: Visual Aid: None  Hearing Aid:    Jewelry: Jewelry: None  Clothing: Clothing: With patient  Other Valuables: Other Valuables: Cell Phone, Other (comment)(& ID cards given to )  Valuables sent to safe:        PATIENT INSTRUCTIONS:    After General Anesthesia or Intravenous Sedation, for 24 hours or while taking prescription Narcotics:        Someone should be with you for the next 24 hours. For your own safety, a responsible adult must drive you home. · Limit your activities  · Recommended activity: Rest today, up with assistance today. Do not climb stairs or shower unattended for the next 24 hours. · Please start with a soft bland diet and advance as tolerated (no nausea) to regular diet. · If you have a sore throat you should try the following: fluids, warm salt water gargles, or throat lozenges. If it does not improve after several days please follow up with your primary physician. · Do not drive and operate hazardous machinery  · Do not make important personal or business decisions  · Do  not drink alcoholic beverages  · If you have not urinated within 8 hours after discharge, please contact your surgeon on call.     Report the following to your surgeon:  · Excessive pain, swelling, redness or odor of or around the surgical area  · Temperature over 100.5  · Nausea and vomiting lasting longer than 4 hours or if unable to take medications  · Any signs of decreased circulation or nerve impairment to extremity: change in color, persistent  numbness, tingling, coldness or increase pain      · You will receive a Post Operative Call from one of the Recovery Room Nurses on the day after your surgery to check on you. It is very important for us to know how you are recovering after your surgery. If you have an issue or need to speak with someone, please call your surgeon, do not wait for the post operative call. · You may receive an e-mail or letter in the mail from [facility] regarding your experience with us in the Ambulatory Surgery Unit. Your feedback is valuable to us and we appreciate your participation in the survey. · If the above instructions are not adequate or you are having problems after your surgery, call the physician at their office number. · We wish you a speedy recovery ? What to do at Home:      *  Please give a list of your current medications to your Primary Care Provider. *  Please update this list whenever your medications are discontinued, doses are      changed, or new medications (including over-the-counter products) are added. *  Please carry medication information at all times in case of emergency situations. If you have not received your influenza and/or pneumococcal vaccine, please follow up with your primary care physician. The discharge information has been reviewed with the patient and caregiver. The patient and caregiver verbalized understanding.

## 2020-03-26 NOTE — INTERVAL H&P NOTE
Update History & Physical    The Patient's History and Physical of March 25,    was reviewed with the patient and I examined the patient. There was no change. The surgical site was confirmed by the patient and me. Plan:  The risk, benefits, expected outcome, and alternative to the recommended procedure have been discussed with the patient. Patient understands and wants to proceed with the procedure.     Electronically signed by Victorino Locke MD on 3/26/2020 at 9:56 AM

## 2020-03-26 NOTE — PERIOP NOTES
Permission received to review discharge instructions and discuss private health information with , Laquita Pacheco. Patient states that  will be with them for at least 24 hours following today's procedure. Mistral-Air warming blanket applied at this time. Set to appropriate setting that is comfortable to patient. Will continue to monitor.

## 2020-03-26 NOTE — OP NOTES
SUCTION CURETTAGE FULL OP NOTE    Christie Jorge  [id number]  1990      DATE OF PROCEDURE:  3/26/2020    PREOPERATIVE DIAGNOSIS:  MISSED  at 6 weeks    POSTOPERATIVE DIAGNOSIS:  MISSED  at 6 weeks    PROCEDURE: Procedure(s):  SUCTION DILATATION AND CURETTAGE (CHOICE ANESTH) (ESSENTIAL)     SURGEON:  Palomo Perez MD    ASSISTANT: none    ANESTHESIA:monitored anesthesia care    EBL: less than 50 ml    SPECIMENS: Products of conception    FINDINGS: Uterus appropriate size for 6 weeks gestation. Good cri at end of procedure. PROCEDURE: Patient was placed on the operating table in the supine position and after induction of anesthesia she was placed in the dorsal lithotomy position and prepped and draped in the usual fashion for vaginal surgery. Cervix was exposed with a weighted vaginal speculum and grasped with a single-tooth tenaculum. 10cc of 1% lidocaine was injected into the cervix to achieve a paracervical block in the usual fashion. The cervix was dilated easily to 7mm. A curved 7 suction curette device was then introduced into the endometrial cavity. Thorough suction curettage followed by sharp curettage with a large curette followed again by suction curettage was performed until the suction returned no further clot or products of conception. Adequate curettage was felt to be obtained. The uterus was massaged. Hemostasis appeared normal. Tenaculum sites were made hemostatic with silver nitrate. Instruments were removed. The patient went to the recovery room in satisfactory condition. All counts were correct times two. Of note blood type was RH pos.

## 2020-03-26 NOTE — PERIOP NOTES
Lattie End  1990  [id number]    Situation:  Verbal report given from: Governor ORLANDO Field CRNA RN  Procedure: Procedure(s):  SUCTION DILATATION AND CURETTAGE (CHOICE ANESTH) (ESSENTIAL)    Background:    Preoperative diagnosis: MISSED     Postoperative diagnosis: MISSED     :  Dr. Nehemiah Berger    Assistant(s): Circ-1: Miguel Gong RN  Scrub Tech-1: Greg Lees     Specimens:   ID Type Source Tests Collected by Time Destination   1 : Products of conception GINGER Mendoza of Conception  Laura Millan MD 3/26/2020 1016 Pathology       Assessment:  Intra-procedure medications         Anesthesia gave intra-procedure sedation and medications, see anesthesia flow sheet     Intravenous fluids: LR@ KVO     Vital signs stable       Recommendation:    Permission to share finding with Merlin : yes

## 2020-03-26 NOTE — TELEPHONE ENCOUNTER
PCP: Juanita Marie MD    Last appt: 7/30/2019  Future Appointments   Date Time Provider Emerita Rain   8/4/2020  3:00 PM Juanita Marie MD [facility]       Requested Prescriptions     Pending Prescriptions Disp Refills    sertraline (ZOLOFT) 50 mg tablet 90 Tab 1     Sig: Take 1 Tab by mouth daily.

## 2020-04-08 RX ORDER — SERTRALINE HYDROCHLORIDE 50 MG/1
50 TABLET, FILM COATED ORAL DAILY
Qty: 90 TAB | Refills: 0 | Status: SHIPPED | OUTPATIENT
Start: 2020-04-08 | End: 2020-08-04

## 2020-04-08 NOTE — TELEPHONE ENCOUNTER
Patient requesting 90 day supply as it is most cost effective. Per patient benefit, [facility] is now the preferred pharmacy for all maintenance medications. Please update patient's preferred pharmacy in your records. Please call [phone] with any questions.

## 2020-05-12 ENCOUNTER — NURSE TRIAGE (OUTPATIENT)
Dept: OTHER | Facility: CLINIC | Age: 30
End: 2020-05-12

## 2020-05-12 NOTE — TELEPHONE ENCOUNTER
Pt is an employee at [employer], a [facility] [employer]. She was exposed to CV19 on 5/9 at work. She has no symptoms of illness. She was frustrated because she was trying to call [facility] about her exposure but was routed through to multiple different lines and unable to get any answers about her health and how she should handle her exposure. Reason for Disposition   [1] COVID-19 EXPOSURE within last 14 days AND [2] NO cough, fever, or breathing difficulty AND [3] exposed person is a healthcare worker who was NOT using all recommended personal protective equipment (i.e., a respirator-N95 mask, eye protection, gloves, and gown)    Answer Assessment - Initial Assessment Questions  1. CLOSE CONTACT: \"Who is the person with the confirmed or suspected COVID-19 infection that you were exposed to?\"      Patient at work  2. PLACE of CONTACT: \"Where were you when you were exposed to COVID-19? \" (e.g., home, school, medical waiting room; which city?)      hospital  3. TYPE of CONTACT: \"How much contact was there? \" (e.g., sitting next to, live in same house, work in same office, same building)      close  4. DURATION of CONTACT: \"How long were you in contact with the COVID-19 patient? \" (e.g., a few seconds, passed by person, a few minutes, live with the patient)      Multiple minutes  5. DATE of CONTACT: \"When did you have contact with a COVID-19 patient? \" (e.g., how many days ago)      5/9  6. TRAVEL: \"Have you traveled out of the country recently? \" If so, \"When and where? \"      * Also ask about out-of-state travel, since the CDC has identified some high risk cities for community spread in the [address]. * Note: Travel becomes less relevant if there is widespread community transmission where the patient lives. no  7. COMMUNITY SPREAD: \"Are there lots of cases of COVID-19 (community spread) where you live? \" (See public health department website, if unsure)    * MAJOR community spread: high number of cases; numbers of cases are increasing; many people hospitalized. 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 SYMPTOMS: \"Do you have any symptoms? \" (e.g., fever, cough, breathing difficulty)      no  9. PREGNANCY OR POSTPARTUM: \"Is there any chance you are pregnant? \" \"When was your last menstrual period? \" \"Did you deliver in the last 2 weeks? \"      no  10. HIGH RISK: \"Do you have any heart or lung problems? Do you have a weak immune system? \" (e.g., CHF, COPD, asthma, HIV positive, chemotherapy, renal failure, diabetes mellitus, sickle cell anemia)        no    Protocols used: CORONAVIRUS (COVID-19) EXPOSURE-ADULT-AH

## 2020-07-29 ENCOUNTER — EMPLOYEE WELLNESS (OUTPATIENT)
Dept: OTHER | Facility: CLINIC | Age: 30
End: 2020-07-29

## 2020-07-29 LAB
CHOLEST SERPL-MCNC: 184 MG/DL
GLUCOSE SERPL-MCNC: 96 MG/DL (ref 65–100)
HDLC SERPL-MCNC: 51 MG/DL
LDLC SERPL CALC-MCNC: 107.8 MG/DL (ref 0–100)
TRIGL SERPL-MCNC: 126 MG/DL (ref ?–150)

## 2020-08-04 ENCOUNTER — VIRTUAL VISIT (OUTPATIENT)
Dept: INTERNAL MEDICINE CLINIC | Age: 30
End: 2020-08-04
Payer: COMMERCIAL

## 2020-08-04 DIAGNOSIS — Z00.00 PHYSICAL EXAM: ICD-10-CM

## 2020-08-04 DIAGNOSIS — F41.9 ANXIETY: Primary | ICD-10-CM

## 2020-08-04 DIAGNOSIS — K21.9 GASTROESOPHAGEAL REFLUX DISEASE WITHOUT ESOPHAGITIS: ICD-10-CM

## 2020-08-04 DIAGNOSIS — E66.01 OBESITY, MORBID (HCC): ICD-10-CM

## 2020-08-04 PROCEDURE — 99213 OFFICE O/P EST LOW 20 MIN: CPT | Performed by: INTERNAL MEDICINE

## 2020-08-04 RX ORDER — METFORMIN HYDROCHLORIDE 1000 MG/1
1000 TABLET ORAL 2 TIMES DAILY WITH MEALS
COMMUNITY
End: 2021-08-15

## 2020-08-04 NOTE — PROGRESS NOTES
HISTORY OF PRESENT ILLNESS  Yuniel Waller is a 27 y.o. female. HPI     Last here 7/30/19. Pt is here for routine care. This is an established visit completed with telemedicine was completed with video assist  the patient acknowledges and agrees to this method of visitation doxyme     BP at be well 116/72     Wt today is 247 lbs pt reports --down 10 lbs x lov   She has been trying to incorporate exercise  She is on metformin 1000 BID  She denies diarrhea   [facility]     Reviewed labs 7/18  Ordered labs      Pt follows with Dr Kiana Colvin (GI)  Last visit in 8/18  Pt prev took zantac BID for heartburn-no sxs recently has not needed this    She is taking zoloft 75 mg, up from 50 mg for anxiety which helps  Monty is prescribing this currently      prev, Pt c/o menstrual HA x 8 years     Pt follows with Dr Shirley August (gyn)   Last visit 3/24/20  She has had 2 miscarriage, first one 8/19 second 3/20  She is seeing Dr. Ernesto Quesada (gyn)  She had suction dilation and curettage 8/15/19 and 3/26/20  Last visit 6/17/20     Pt is currently taking magnesium        PREVENTIVE:  Colonoscopy: not yet needed  Pap: [facility], 7/20   Mammogram: not yet needed  Dexa: not yet needed  Tdap: 07/30/18  Pneumovax: not yet needed  Shingrix: not yet needed  Flu shot: Fall 2019  Eye exam: 3/18, due reminded  Lipids: 7/20  be well  A1c:7 /18 5.2    Patient Active Problem List    Diagnosis Date Noted    Gastroesophageal reflux disease without esophagitis 10/30/2018    Anxiety 10/30/2018    Obesity, morbid ([facility] [address] 07/30/2018     Current Outpatient Medications   Medication Sig Dispense Refill    famotidine (Pepcid) 20 mg tablet Take 20 mg by mouth two (2) times a day.  sertraline (ZOLOFT) 50 mg tablet TAKE 1 TABLET BY MOUTH EVERY DAY 30 Tab 5    cetirizine (ZYRTEC) 10 mg tablet Take 10 mg by mouth daily.       ibuprofen (MOTRIN) 800 mg tablet Take 1 Tab by mouth every eight (8) hours as needed for Pain. 30 Tab 0    [id number] raven-folic-dha (PRENATAL DHA+COMPLETE PRENATAL) 30975-300 mg-mcg-mg cmpk Take 1 Tab by mouth. Past Surgical History:   Procedure Laterality Date    HX DILATION AND CURETTAGE  08/2019    HX TONSILLECTOMY      HX WISDOM TEETH EXTRACTION      UPPER GI ENDOSCOPY,BIOPSY  5/8/2018           Lab Results   Component Value Date/Time    WBC 7.1 07/30/2018 10:12 AM    HGB 12.8 07/30/2018 10:12 AM    HCT 38.6 07/30/2018 10:12 AM    PLATELET 486 39/37/1263 10:12 AM    MCV 88 07/30/2018 10:12 AM     Lab Results   Component Value Date/Time    Cholesterol, total 184 07/29/2020 07:25 AM    HDL Cholesterol 51 07/29/2020 07:25 AM    LDL, calculated 107.8 (H) 07/29/2020 07:25 AM    Triglyceride 126 07/29/2020 07:25 AM     Lab Results   Component Value Date/Time    GFR est non- 07/30/2018 10:12 AM    GFR est  07/30/2018 10:12 AM    Creatinine 0.65 07/30/2018 10:12 AM    BUN 12 07/30/2018 10:12 AM    Sodium 141 07/30/2018 10:12 AM    Potassium 4.1 07/30/2018 10:12 AM    Chloride 103 07/30/2018 10:12 AM    CO2 23 07/30/2018 10:12 AM        Review of Systems   Respiratory: Negative for shortness of breath. Cardiovascular: Negative for chest pain. Physical Exam  Constitutional:       Appearance: Normal appearance. She is not toxic-appearing or diaphoretic. HENT:      Head: Normocephalic and atraumatic. Eyes:      Conjunctiva/sclera: Conjunctivae normal.      Pupils: Pupils are equal, round, and reactive to light. Pulmonary:      Effort: Pulmonary effort is normal. No respiratory distress. Neurological:      Mental Status: She is alert and oriented to person, place, and time. Mental status is at baseline. Gait: Gait normal.   Psychiatric:         Mood and Affect: Mood normal.         Behavior: Behavior normal.         ASSESSMENT and PLAN    ICD-10-CM ICD-9-CM    1. Anxiety --controlled on Zoloft 75 mg F41.9 300.00    2.  Obesity, morbid ([address] --increasing activity on metformin working on portions weight is improved continue E66.01 278.01    3. Gastroesophageal reflux disease without esophagitis     Previously saw gastroenterology doing well without medication K21.9 530.81    4. Physical exam labs due ordered pelvic exam up-to-date flu shot up-to-date eye exam is due patient is working on weight loss weight improved continue to work on this D53.47 W09.9 METABOLIC PANEL, COMPREHENSIVE      CBC W/O DIFF      TSH 3RD GENERATION           Scribed by Emmett Dancer of [facility], as dictated by Dr. Marina Cervantes. Current diagnosis and concerns discussed with pt at length. Pt understands risks and benefits or current treatment plan and medications, and accepts the treatment and medication with any possible risks. Pt asks appropriate questions, which were answered. Pt was instructed to call with any concerns or problems. I have reviewed the note documented by the scribe. The services provided are my own. The documentation is accurate     Rebecca Verdugo, who was evaluated through a synchronous (real-time) audio-video encounter, and/or her healthcare decision maker, is aware that it is a billable service, with coverage as determined by her insurance carrier. She provided verbal consent to proceed: Yes, and patient identification was verified. It was conducted pursuant to the emergency declaration under the [facility] [address] authority and the [employer] and [employer] General Act. A caregiver was present when appropriate. Ability to conduct physical exam was limited. I was in the office. The patient was at home.

## 2020-08-12 LAB
ALBUMIN SERPL-MCNC: 4.6 G/DL (ref 3.9–5)
ALBUMIN/GLOB SERPL: 2.2 {RATIO} (ref 1.2–2.2)
ALP SERPL-CCNC: 49 IU/L (ref 39–117)
ALT SERPL-CCNC: 18 IU/L (ref 0–32)
AST SERPL-CCNC: 20 IU/L (ref 0–40)
BILIRUB SERPL-MCNC: <0.2 MG/DL (ref 0–1.2)
BUN SERPL-MCNC: 13 MG/DL (ref 6–20)
BUN/CREAT SERPL: 17 (ref 9–23)
CALCIUM SERPL-MCNC: 9.5 MG/DL (ref 8.7–10.2)
CHLORIDE SERPL-SCNC: 102 MMOL/L (ref 96–106)
CO2 SERPL-SCNC: 22 MMOL/L (ref 20–29)
CREAT SERPL-MCNC: 0.77 MG/DL (ref 0.57–1)
ERYTHROCYTE [DISTWIDTH] IN BLOOD BY AUTOMATED COUNT: 12.7 % (ref 11.7–15.4)
GLOBULIN SER CALC-MCNC: 2.1 G/DL (ref 1.5–4.5)
GLUCOSE SERPL-MCNC: 82 MG/DL (ref 65–99)
HCT VFR BLD AUTO: 38.3 % (ref 34–46.6)
HGB BLD-MCNC: 12.6 G/DL (ref 11.1–15.9)
MCH RBC QN AUTO: 28.3 PG (ref 26.6–33)
MCHC RBC AUTO-ENTMCNC: 32.9 G/DL (ref 31.5–35.7)
MCV RBC AUTO: 86 FL (ref 79–97)
PLATELET # BLD AUTO: 324 X10E3/UL (ref 150–450)
POTASSIUM SERPL-SCNC: 4.2 MMOL/L (ref 3.5–5.2)
PROT SERPL-MCNC: 6.7 G/DL (ref 6–8.5)
RBC # BLD AUTO: 4.45 X10E6/UL (ref 3.77–5.28)
SODIUM SERPL-SCNC: 139 MMOL/L (ref 134–144)
TSH SERPL DL<=0.005 MIU/L-ACNC: 1.29 UIU/ML (ref 0.45–4.5)
WBC # BLD AUTO: 7.1 X10E3/UL (ref 3.4–10.8)

## 2020-10-20 RX ORDER — SERTRALINE HYDROCHLORIDE 50 MG/1
75 TABLET, FILM COATED ORAL DAILY
Qty: 45 TAB | Refills: 0 | Status: SHIPPED | OUTPATIENT
Start: 2020-10-20 | End: 2021-09-28

## 2020-10-20 RX ORDER — SERTRALINE HYDROCHLORIDE 50 MG/1
75 TABLET, FILM COATED ORAL DAILY
Qty: 135 TAB | Refills: 1 | Status: SHIPPED | OUTPATIENT
Start: 2020-10-20 | End: 2021-04-17

## 2020-10-20 NOTE — TELEPHONE ENCOUNTER
----- Message from Bertha Onelia sent at 10/20/2020  3:52 PM EDT -----  Regarding: Dr. Mccartney Servant (if not patient): Pt  Relationship of caller (if not patient): self  Best contact number(s): [phone]  Name of medication and dosage if known: Sertraline 50mg tab  Is patient out of this medication (yes/no): yes  Pharmacy name: [facility]  Pharmacy listed in chart? (yes/no): yes  Pharmacy phone number: NA  Date of last visit: 08/04/20  Details to clarify the request: Office sent over a refill request for pt's Sertraline through [facility]. Request got lost between offices and pt never received her script.  She is currently out and requesting a 30 day script be sent to [facility] in [place] and a 90 day supply be sent through [facility] from [place]

## 2020-10-20 NOTE — TELEPHONE ENCOUNTER
PCP: Jeremy Trujillo MD    Last appt: 8/4/2020  No future appointments. Requested Prescriptions     Pending Prescriptions Disp Refills    sertraline (ZOLOFT) 50 mg tablet 135 Tab 1     Sig: Take 1.5 Tabs by mouth daily.

## 2020-10-20 NOTE — TELEPHONE ENCOUNTER
PCP: Dariana Clayton MD    Last appt: 8/4/2020  No future appointments. Requested Prescriptions     Pending Prescriptions Disp Refills    sertraline (ZOLOFT) 50 mg tablet 45 Tab 0     Sig: Take 1.5 Tabs by mouth daily.

## 2020-10-21 ENCOUNTER — TELEPHONE (OUTPATIENT)
Dept: INTERNAL MEDICINE CLINIC | Age: 30
End: 2020-10-21

## 2020-10-21 NOTE — TELEPHONE ENCOUNTER
----- Message from Collette Chaney sent at 10/20/2020  7:12 PM EDT -----  Regarding: Dr. Jie Delgado (if not patient): Pt    Relationship of caller (if not patient): self    Best contact number(s): [phone]    Name of medication and dosage if known: Sertraline 50mg tab    Is patient out of this medication (yes/no): yes    Pharmacy name: [facility]    Pharmacy listed in chart? (yes/no): yes    Pharmacy phone number: NA    Date of last visit: 08/04/20    Details to clarify the request: Office sent over a refill request for pt's   Sertraline through [facility]. Request got lost between offices and   pt never received her script.  She is currently out and requesting a 30 day script be sent to [facility] in [place] and a 90 day supply be sent through [facility]

## 2020-10-22 NOTE — TELEPHONE ENCOUNTER
Called out and spoke to pt. Two pt identifiers confirmed. Informed pt medications approved 10/20/20 to [facility] and [facility]. Advised pt to call [facility] and if they report they did not have it, LPN RR will call in. Pt verbalized understanding of information discussed w/ no further questions at this time.

## 2021-01-14 LAB
ANTIBODY SCREEN, EXTERNAL: NEGATIVE
CHLAMYDIA, EXTERNAL: NEGATIVE
HBSAG, EXTERNAL: NEGATIVE
HIV, EXTERNAL: NON REACTIVE
N. GONORRHEA, EXTERNAL: NEGATIVE
RPR, EXTERNAL: NON REACTIVE
RUBELLA, EXTERNAL: NORMAL
TYPE, ABO & RH, EXTERNAL: NORMAL

## 2021-04-12 NOTE — TELEPHONE ENCOUNTER
Dr. Zachary Armstrong, patient interested in free prenatal vitamins and iron per response from [address] [facility]. Order for [facility] Baby Box pending for your convenience. Thank you,  Tara Fallon, PharmCHRISSIE  [address]  Department, toll free: [phone], option [facility].      =======================================================================    POPULATION HEALTH CLINICAL PHARMACY REVIEW - Be Well With Baby    SUBJECTIVE  Triston Tabor is a 27 y.o. female currently identified as interested in receiving a [facility] \"Baby Box\" containing a 1 year supply of prenatal vitamins and Ferrous gluconate 324mg form [facility]. Contents of Baby Box:   Welcome Letter   3 month supply of Certifi Prenatal Gummies (Take 2 gummies once daily) with 3 refills   3 month supply of Ferrous gluconate 324mg tablets (Take 1 tablet once daily) with 3 refills    Thank you  Ly Fallon, Romulo  [address]  Department, toll free: [phone], option [facility].

## 2021-05-06 ENCOUNTER — PATIENT OUTREACH (OUTPATIENT)
Dept: OTHER | Age: 31
End: 2021-05-06

## 2021-05-06 NOTE — PROGRESS NOTES
ACM attempted to reach patient for Maternity CM call. HIPAA compliant message left requesting a return phone call at patients convenience. Will continue to follow.      BWWB  UNKNOWN OB INFO  YES PRENATAL/BREAST PUMP

## 2021-05-19 ENCOUNTER — PATIENT OUTREACH (OUTPATIENT)
Dept: OTHER | Age: 31
End: 2021-05-19

## 2021-05-19 RX ORDER — GUAIFENESIN 100 MG/5ML
81 LIQUID (ML) ORAL DAILY
COMMUNITY
End: 2021-08-15

## 2021-05-21 NOTE — PROGRESS NOTES
Patient eligible for [facility] Manager contacted the patient by telephone to discuss the maternity management program.  Patient agrees to care management services at this time. Verified name and  with patient as identifiers. Risk Factors Identified: previous miscarrigaes/fetal cleft palate and vsd found on us    Needs to be reviewed by the provider   none         Method of communication with provider : none    Does patient have OB/Gyn Selected? Yes    Discussed follow up appointments. If no appointment was previously scheduled, appointment scheduling offered: Yes  [facility] follow up appointment(s): No future appointments. [facility]    OB History:   OB History    Para Term  AB Living   1             SAB TAB Ectopic Molar Multiple Live Births                    # Outcome Date GA Lbr Blu/2nd Weight Sex Delivery Anes PTL Lv   1 Current                carlos 21    Medication reconciliation was performed with patient, who verbalizes understanding of administration of home medications. Advised obtaining a 90-day supply of all daily and as-needed medications. Barriers/Support system:  spouse   Return to work planning? Yes    2nd and 3rd Trimester Focused Assessment: 27 weeks  Healthy behavior review, Fetal movement-baby boy moving well per mom, Red flags: bleeding/leaking, Labor signs and symptoms-no contractions lof vag bleeding, Nutrition-healthy food choices/snacks and Movement/Exercise-enouraging continuing to walk as is ok with md. S/W pt today she is doing well so far. She has some hx of miscarriages and this baby has cleft palate and vsd which cardiology and craniofacial are involved in this care. CARDIOLOGY ECHO AT BIRTH PLAN FOR POTENTIAL CARDIAC SURGERY IN 3-5 MONTHS post delivery. We discussed the bwwb program and the benefits.  Mom received both items,Mom still working best time to reach her is after 4:30pm. Glu 3h on 5/24. No additional questions or concerns. Will continue to monitor    Birth planning: normal vag delivery  Maternity Classes? No    Patient given an opportunity to ask questions and does not have any further questions or concerns at this time. Reviewed appropriate site of care based on symptoms and resources available to patient including: Benefits related nurse triage line, When to call 911 and Condition related references. The patient agrees to contact the OB/Gyn office for questions related to their healthcare. Goals      Attends follow-up appointments as directed       Educate and encourage importance of FU for prevention of complications or disease;   Assess the patient's relationship with a PCP and next FU visit scheduled;   Discuss importance of adherence to treatment plan and follow up visits;   Identify any barriers in transportation or access to FU appointments.  Assist patient with making FU appointments as needed;    It's also a good idea to know your test results.  Keep a list of the medicines you take.  Patient will identify signs and symptoms requiring evaluation and intervention      Call 911 anytime you think you may need emergency care. For example, call if:  · You passed out (lost consciousness). · You have a seizure. · You have severe vaginal bleeding. · You have severe pain in your belly or pelvis. · You have had fluid gushing or leaking from your vagina and you know or think the umbilical cord is bulging into your vagina. If this happens, immediately get down on your knees so your rear end (buttocks) is higher than your head. This will decrease the pressure on the cord until help arrives. · You have severe vaginal bleeding. · You have sudden, severe pain in your belly. · You passed out (lost consciousness).   · You think you are about to deliver your baby and can't make it safely to the hospital.      Call your doctor now or seek immediate medical care if:  · You have signs of preeclampsia, such as:  ? Sudden swelling of your face, hands, or feet. ? New vision problems (such as dimness, blurring, or seeing spots). ? A severe headache. · You have any vaginal bleeding. · You have belly pain or cramping. · You have a fever. · You have had regular contractions (with or without pain) for an hour. This means that you have 6 or more within 1 hour after you change your position and drink fluids. · You have a sudden release of fluid from the vagina. · You have low back pain or pelvic pressure that does not go away. · You notice that your baby has stopped moving or is moving much less than normal.  Watch closely for changes in your health, and be sure to contact your doctor if you have any problems. You have symptoms of a urinary tract infection. These may include:  ? Pain or burning when you urinate. ? A frequent need to urinate without being able to pass much urine. ? Pain in the flank, which is just below the rib cage and above the waist on either side of the back. ? Blood in your urine. Plan for follow-up call in 10-14 days based on severity of symptoms and risk factors.   Plan for next call: self management-prenatal care/check for bwwb items

## 2021-07-15 LAB
CHOLEST SERPL-MCNC: 267 MG/DL
GLUCOSE SERPL-MCNC: 77 MG/DL (ref 65–100)
HDLC SERPL-MCNC: 75 MG/DL
LDLC SERPL CALC-MCNC: 151.4 MG/DL (ref 0–100)
TRIGL SERPL-MCNC: 203 MG/DL (ref ?–150)

## 2021-07-18 LAB
EST. AVERAGE GLUCOSE BLD GHB EST-MCNC: 97 MG/DL
HBA1C MFR BLD: 5 % (ref 4–5.6)

## 2021-07-19 ENCOUNTER — PATIENT OUTREACH (OUTPATIENT)
Dept: OTHER | Age: 31
End: 2021-07-19

## 2021-07-19 LAB — GRBS, EXTERNAL: NEGATIVE

## 2021-07-19 NOTE — PROGRESS NOTES
Patient eligible for [employer] [facility] Manager contacted the patient by telephone to discuss the maternity management program.  Patient agrees to care management services at this time. Verified name and  with patient as identifiers. Risk Factors Identified: normal    Needs to be reviewed by the provider   none         Method of communication with provider : none    Does patient have OB/Gyn Selected? Yes    Discussed follow up appointments. If no appointment was previously scheduled, appointment scheduling offered: Yes  [address] follow up appointment(s):   Future Appointments   Date Time Provider Emerita Rain   2021 10:45 AM Evan Ny MD [facility] BS AMB     Non-[facility] follow up appointment(s): 21 weekly visits now    OB History:   OB History    Para Term  AB Living   1             SAB TAB Ectopic Molar Multiple Live Births                    # Outcome Date GA Lbr Blu/2nd Weight Sex Delivery Anes PTL Lv   1 Current                carlos 21    Medication reconciliation was performed with patient, who verbalizes understanding of administration of home medications. Advised obtaining a 90-day supply of all daily and as-needed medications. Barriers/Support system:  spouse   Return to work planning? Yes    2nd and 3rd Trimester Focused Assessment:   Healthy behavior review, Fetal movement-baby moving well, Red flags: bleeding/leaking and Labor signs and symptoms-no labor signs or concerns this week. Will continue to monitor. feeling good so far  no contractions/still working  bp's good   bwwb rece all items   next appt with ob   still closed soft appt today going weekly at this time     Patient given an opportunity to ask questions and does not have any further questions or concerns at this time. Were discharge instructions available to patient? Yes.  Reviewed appropriate site of care based on symptoms and resources available to patient including: Benefits related nurse triage line and When to call 911. The patient agrees to contact the OB/Gyn office for questions related to their healthcare. Goals      Attends follow-up appointments as directed       Educate and encourage importance of FU for prevention of complications or disease;   Assess the patient's relationship with a PCP and next FU visit scheduled;   Discuss importance of adherence to treatment plan and follow up visits;   Identify any barriers in transportation or access to FU appointments.  Assist patient with making FU appointments as needed;    It's also a good idea to know your test results.  Keep a list of the medicines you take.  Patient will identify signs and symptoms requiring evaluation and intervention      Call 911 anytime you think you may need emergency care. For example, call if:  · You passed out (lost consciousness). · You have a seizure. · You have severe vaginal bleeding. · You have severe pain in your belly or pelvis. · You have had fluid gushing or leaking from your vagina and you know or think the umbilical cord is bulging into your vagina. If this happens, immediately get down on your knees so your rear end (buttocks) is higher than your head. This will decrease the pressure on the cord until help arrives. · You have severe vaginal bleeding. · You have sudden, severe pain in your belly. · You passed out (lost consciousness). · You think you are about to deliver your baby and can't make it safely to the hospital.      Call your doctor now or seek immediate medical care if:  · You have signs of preeclampsia, such as:  ? Sudden swelling of your face, hands, or feet. ? New vision problems (such as dimness, blurring, or seeing spots). ? A severe headache. · You have any vaginal bleeding. · You have belly pain or cramping. · You have a fever. · You have had regular contractions (with or without pain) for an hour. This means that you have 6 or more within 1 hour after you change your position and drink fluids. · You have a sudden release of fluid from the vagina. · You have low back pain or pelvic pressure that does not go away. · You notice that your baby has stopped moving or is moving much less than normal.  Watch closely for changes in your health, and be sure to contact your doctor if you have any problems. You have symptoms of a urinary tract infection. These may include:  ? Pain or burning when you urinate. ? A frequent need to urinate without being able to pass much urine. ? Pain in the flank, which is just below the rib cage and above the waist on either side of the back. ? Blood in your urine. Plan for follow-up call in 10-14 days based on severity of symptoms and risk factors.   Plan for next call: self management-pp f/u call if with complications

## 2021-07-19 NOTE — PATIENT INSTRUCTIONS
Weeks 34 to 36 of Your Pregnancy: Care Instructions  Overview     By now, your baby and your belly have grown quite large. It's almost time to give birth! Your baby's lungs are almost ready to breathe air. The skull bones are firm enough to protect your baby's head, but soft enough to move down through the birth canal.  You may be feeling excited and happy at times--but also anxious or scared. You might wonder how you'll know if you're in labor or what to expect during labor. Try to be open and flexible in your expectations of the birth. Because each birth is different, there's no way to know exactly what childbirth will be like for you. Talk to your doctor or midwife about any concerns you have. If you haven't already had the Tdap shot during this pregnancy, talk to your doctor about getting it. It will help protect your  against pertussis infection. In the 36th week, most women have a test for group B streptococcus (GBS). GBS is a common bacteria that can live in the vagina and rectum. It can make your baby sick after birth. If you test positive, you will get antibiotics during labor. The medicine will help keep your baby from getting the bacteria. Follow-up care is a key part of your treatment and safety. Be sure to make and go to all appointments, and call your doctor if you are having problems. It's also a good idea to know your test results and keep a list of the medicines you take. How can you care for yourself at home? Learn about pain relief choices  · Pain is different for every woman. Talk with your doctor about your feelings about pain. · You can choose from several types of pain relief. These include medicine or breathing techniques, as well as comfort measures. You can use more than one option. · If you choose to have pain medicine during labor, talk to your doctor about your options. Some medicines lower anxiety and help with some of the pain.  Others make your lower body numb so that you won't feel pain. · Be sure to tell your doctor about your pain medicine choice before you start labor or very early in your labor. You may be able to change your mind as labor progresses. · Rarely, a woman is put to sleep by medicine given through a mask or an IV. Labor and delivery  · The first stage of labor has three parts: early, active, and transition. ? Most women have early labor at home. You can stay busy or rest, eat light snacks, drink clear fluids, and start counting contractions. ? When talking during a contraction gets hard, you may be moving to active labor. During active labor, you should head for the hospital if you are not there already. ? You are in active labor when contractions come every 3 to 4 minutes and last about 60 seconds. Your cervix is opening more rapidly. ? If your water breaks, contractions will come faster and stronger. ? During transition, your cervix is stretching, and contractions are coming more rapidly. ? You may want to push, but your cervix might not be ready. Your doctor will tell you when to push. · The second stage starts when your cervix is completely opened and you are ready to push. ? Contractions are very strong to push the baby down the birth canal.  ? You will feel the urge to push. You may feel like you need to have a bowel movement. ? You may be coached to push with contractions. These contractions will be very strong, but you will not have them as often. You can get a little rest between contractions. ? You may be emotional and irritable. You may not be aware of what is going on around you.  ? One last push, and your baby is born. · The third stage is when a few more contractions push out the placenta. This may take 30 minutes or less. · The fourth stage is the welcome recovery. You may feel overwhelmed with emotions and exhausted but alert. This is a good time to start breastfeeding. Where can you learn more?   Go to http://www.[employer].com/  Enter X994 in the search box to learn more about \"Weeks 34 to 36 of Your Pregnancy: Care Instructions. \"  Current as of: October 8, 2020               Content Version: 12.8  © 6379-0392 [employer]wise, Incorporated. Care instructions adapted under license by [employer] (which disclaims liability or warranty for this information). If you have questions about a medical condition or this instruction, always ask your healthcare professional. [address] any warranty or liability for your use of this information.

## 2021-08-11 ENCOUNTER — HOSPITAL ENCOUNTER (INPATIENT)
Age: 31
LOS: 4 days | Discharge: HOME | End: 2021-08-15
Attending: OBSTETRICS & GYNECOLOGY | Admitting: OBSTETRICS & GYNECOLOGY
Payer: COMMERCIAL

## 2021-08-11 PROBLEM — Z34.90 ENCOUNTER FOR INDUCTION OF LABOR: Status: ACTIVE | Noted: 2021-08-11

## 2021-08-11 LAB
ERYTHROCYTE [DISTWIDTH] IN BLOOD BY AUTOMATED COUNT: 14.2 % (ref 11.5–14.5)
GLUCOSE BLD STRIP.AUTO-MCNC: 95 MG/DL (ref 65–117)
GLUCOSE BLD STRIP.AUTO-MCNC: 96 MG/DL (ref 65–117)
HCT VFR BLD AUTO: 33.7 % (ref 35–47)
HGB BLD-MCNC: 11 G/DL (ref 11.5–16)
MCH RBC QN AUTO: 28.1 PG (ref 26–34)
MCHC RBC AUTO-ENTMCNC: 32.6 G/DL (ref 30–36.5)
MCV RBC AUTO: 86.2 FL (ref 80–99)
NRBC # BLD: 0 K/UL (ref 0–0.01)
NRBC BLD-RTO: 0 PER 100 WBC
PLATELET # BLD AUTO: 260 K/UL (ref 150–400)
PMV BLD AUTO: 10.2 FL (ref 8.9–12.9)
RBC # BLD AUTO: 3.91 M/UL (ref 3.8–5.2)
SERVICE CMNT-IMP: NORMAL
SERVICE CMNT-IMP: NORMAL
WBC # BLD AUTO: 9.4 K/UL (ref 3.6–11)

## 2021-08-11 PROCEDURE — 65270000029 HC RM PRIVATE

## 2021-08-11 PROCEDURE — 3E0P7VZ INTRODUCTION OF HORMONE INTO FEMALE REPRODUCTIVE, VIA NATURAL OR ARTIFICIAL OPENING: ICD-10-PCS | Performed by: OBSTETRICS & GYNECOLOGY

## 2021-08-11 PROCEDURE — 74011636637 HC RX REV CODE- 636/637: Performed by: OBSTETRICS & GYNECOLOGY

## 2021-08-11 PROCEDURE — 85027 COMPLETE CBC AUTOMATED: CPT

## 2021-08-11 PROCEDURE — 82962 GLUCOSE BLOOD TEST: CPT

## 2021-08-11 PROCEDURE — 36415 COLL VENOUS BLD VENIPUNCTURE: CPT

## 2021-08-11 RX ORDER — SODIUM CHLORIDE 0.9 % (FLUSH) 0.9 %
5-40 SYRINGE (ML) INJECTION EVERY 8 HOURS
Status: DISCONTINUED | OUTPATIENT
Start: 2021-08-11 | End: 2021-08-15 | Stop reason: HOSPADM

## 2021-08-11 RX ORDER — ONDANSETRON 4 MG/1
4 TABLET, ORALLY DISINTEGRATING ORAL
Status: DISCONTINUED | OUTPATIENT
Start: 2021-08-11 | End: 2021-08-13 | Stop reason: HOSPADM

## 2021-08-11 RX ORDER — OXYTOCIN/RINGER'S LACTATE 30/500 ML
1-25 PLASTIC BAG, INJECTION (ML) INTRAVENOUS
Status: DISCONTINUED | OUTPATIENT
Start: 2021-08-12 | End: 2021-08-15 | Stop reason: HOSPADM

## 2021-08-11 RX ORDER — BUTORPHANOL TARTRATE 1 MG/ML
2 INJECTION INTRAMUSCULAR; INTRAVENOUS
Status: DISCONTINUED | OUTPATIENT
Start: 2021-08-11 | End: 2021-08-15 | Stop reason: HOSPADM

## 2021-08-11 RX ORDER — DEXTROSE 50 % IN WATER (D50W) INTRAVENOUS SYRINGE
12.5-25 AS NEEDED
Status: DISCONTINUED | OUTPATIENT
Start: 2021-08-11 | End: 2021-08-15 | Stop reason: HOSPADM

## 2021-08-11 RX ORDER — SODIUM CHLORIDE 0.9 % (FLUSH) 0.9 %
5-40 SYRINGE (ML) INJECTION AS NEEDED
Status: DISCONTINUED | OUTPATIENT
Start: 2021-08-11 | End: 2021-08-15 | Stop reason: HOSPADM

## 2021-08-11 RX ORDER — OXYTOCIN/RINGER'S LACTATE 30/500 ML
87.3 PLASTIC BAG, INJECTION (ML) INTRAVENOUS AS NEEDED
Status: DISCONTINUED | OUTPATIENT
Start: 2021-08-11 | End: 2021-08-15 | Stop reason: HOSPADM

## 2021-08-11 RX ORDER — SERTRALINE HYDROCHLORIDE 50 MG/1
100 TABLET, FILM COATED ORAL DAILY
Status: DISCONTINUED | OUTPATIENT
Start: 2021-08-12 | End: 2021-08-15 | Stop reason: HOSPADM

## 2021-08-11 RX ORDER — OXYTOCIN/RINGER'S LACTATE 30/500 ML
10 PLASTIC BAG, INJECTION (ML) INTRAVENOUS AS NEEDED
Status: DISCONTINUED | OUTPATIENT
Start: 2021-08-11 | End: 2021-08-15 | Stop reason: HOSPADM

## 2021-08-11 RX ORDER — MAG HYDROX/ALUMINUM HYD/SIMETH 200-200-20
30 SUSPENSION, ORAL (FINAL DOSE FORM) ORAL
Status: DISCONTINUED | OUTPATIENT
Start: 2021-08-11 | End: 2021-08-13 | Stop reason: HOSPADM

## 2021-08-11 RX ORDER — ACETAMINOPHEN 325 MG/1
650 TABLET ORAL
Status: DISCONTINUED | OUTPATIENT
Start: 2021-08-11 | End: 2021-08-13 | Stop reason: HOSPADM

## 2021-08-11 RX ORDER — MAGNESIUM SULFATE 100 %
4 CRYSTALS MISCELLANEOUS AS NEEDED
Status: DISCONTINUED | OUTPATIENT
Start: 2021-08-11 | End: 2021-08-15 | Stop reason: HOSPADM

## 2021-08-11 RX ADMIN — HUMAN INSULIN 12 UNITS: 100 INJECTION, SUSPENSION SUBCUTANEOUS at 21:58

## 2021-08-11 NOTE — PROGRESS NOTES
1545-Pt arrived to room 4 for induction of labor for suspected LGA per patient. Pt states baby also has possible VSD x 2 and possible cleft palate. Pt is being followed by pediatric cardiology and the craniofacial team (Madhuri Silva). Pt states GDM, currently on insulin nightly. States appropriate fetal movement, states occasional cramping. 1618-Dr Knig at bedside, viewed strip and discussing POC with patient and . 1622-Bedside ultrasound by Dr Dimas Avery. Vertex presentation confirmed. 1630-SVE done by Dr Dimas Avery. 1 cm. Cervical ripening balloon placed by Dr Dimas Avery. Pt tolerated well. Monitor x 2 hours after cervical ripening balloon placement. 1740-NICU notified of patient's arrival.   1745-[facility] aware that patient is here for induction per patient. Wishes to up updated when labor progresses and delivery imminent. 1830-Pt off monitor to ambulate PRN. Family at bedside. 1945-Bedside and Verbal shift change report given to AELKSEY Langford RN  (oncoming nurse) by Annetta Bocanegra RN  (offgoing nurse). Report included the following information SBAR.

## 2021-08-11 NOTE — PROGRESS NOTES
08/11/21 1557   Maternal Vital Signs   Temp 97.8 °F (36.6 °C)   Temp Source Oral   Pulse (Heart Rate) (!) 122   Resp Rate 16   O2 Sat (%) 96 %   Level of Consciousness Alert (0)   /66   MAP (Monitor) 86   MAP (Calculated) 83   MEW-T Score 1   pt just arrived to unit, no complaints voiced. Will continue to monitor.

## 2021-08-11 NOTE — H&P
History and Physical    Patient: Janak Thomas MRN: [id number]  SSN: [id number]    YOB: 1990  Age: 32 y.o. Sex: female      Subjective:      Janak Thomas is a 32 y.o. female who  at [address] for [address]. She is doing well without complaints. Denies contractions, LOF, VB, VD, dysuria. Good FM. Pregnancy is complicated by:   - anemia on iron supplementation  - GDMA1 on 25U NPH nightly, well controlled  - Fetal VSD x2 s/p peds cardiology  declined amnio. Plan fetal echo within 48 hours of delivery   - Cleft palate s/p consultation with craniofacial team  - Anxiety on 100mg sertraline  - Maternal obesity: BMI 44. Growth 86%ile  At 36w  - RPL followed by Dr. Navya Kelly    Past Medical History:   Diagnosis Date    Anemia     Anxiety     Diabetes ([address]     gestational diabetes-on metformin and insulin     GERD (gastroesophageal reflux disease)     Gestational diabetes     Infertility, female     Psychiatric problem     on zoloft      Past Surgical History:   Procedure Laterality Date    HX DILATION AND CURETTAGE  2019    HX OTHER SURGICAL      D & C     HX OTHER SURGICAL      D & C     HX OTHER SURGICAL      tonsillectomy and wisdom teeth     HX TONSILLECTOMY      HX WISDOM TEETH EXTRACTION      UPPER GI ENDOSCOPY,BIOPSY  2018           Family History   Problem Relation Age of Onset    Cancer Mother         rcc    Hypertension Maternal Grandmother     Cancer Maternal Grandfather      Social History     Tobacco Use    Smoking status: Never Smoker    Smokeless tobacco: Never Used   Substance Use Topics    Alcohol use: Not Currently     Comment: social      Prior to Admission medications    Medication Sig Start Date End Date Taking? Authorizing Provider   cholecalciferol, vitamin D3, (VITAMIN D3 PO) Take 1 Tablet by mouth. Yes Provider, Historical   insulin NPH (HUMULIN N) 100 unit/mL (3 mL) inpn 25 Units by SubCUTAneous route nightly.    Yes Provider, Historical   aspirin 81 mg chewable tablet Take 81 mg by mouth daily. Yes Provider, Historical   sertraline (ZOLOFT) 50 mg tablet Take 1.5 Tabs by mouth daily. Patient taking differently: Take 100 mg by mouth daily. 10/20/20  Yes Kar Connelly MD   metFORMIN (GLUCOPHAGE) 1,000 mg tablet Take 1,000 mg by mouth two (2) times daily (with meals). Yes Provider, Historical   [id number] raven-folic-dha (PRENATAL DHA+COMPLETE PRENATAL) [id number] mg-mcg-mg cmpk Take 1 Tab by mouth. Yes Provider, Historical   cetirizine (ZYRTEC) 10 mg tablet Take 10 mg by mouth daily. Yes Provider, Historical   sertraline (ZOLOFT) 50 mg tablet TAKE ONE AND ONE-HALF TABLETS BY MOUTH EVERY DAY  Patient not taking: Reported on 2021   Kar Connelyl MD   ibuprofen (MOTRIN) 800 mg tablet Take 1 Tab by mouth every eight (8) hours as needed for Pain. Patient not taking: Reported on 2021 3/26/20   Rajesh Millan MD   famotidine (Pepcid) 20 mg tablet Take 20 mg by mouth two (2) times a day. Patient not taking: Reported on 2021    Provider, Historical        No Known Allergies    Review of Systems:  A comprehensive review of systems was negative except for that written in the History of Present Illness. Objective:     Vitals:    21 1557 21 1603   BP: 118/66    Pulse: (!) 122    Resp: 16    Temp: 97.8 °F (36.6 °C)    SpO2: 96%    Weight:  127 kg (280 lb)   Height:  5' 5\" (1.651 m)        Physical Exam:  Gen: alert and oriented X3   Resp:nonlabored respirations  Cardiac: normal peripheral perfusion  Abdomen: soft, nontender, nondistended.  Gravid  Ext: no pitting edema  SVE 1/0/-3, cook catheter placed without difficulty    Bedside ultrasound: cephalic presentation    CEFM 150bpm, mod variability, +accels, no decelerations  [place]: irregular contractions    Labs: GBS negative, Rh+, s/p COVID vaccine        Assessment/Plan:     32yo  at 39w1d admitted for IOL for GDMA2      Induction: cervix 1/0/3  - Consents signed, CBC, STBB, SLIV  - cook catheter placed 1700 without difficulty  - plan pitocin at 0400  - AROM and epidural PRN    IUP: cat 1 fetal tracing  -  Fetal VSD x2 s/p peds cardiology  declined amnio. Plan fetal echo within 48 hours of delivery. Will notify peds now of patients admission  - Cleft palate s/p consultation with craniofacial team. Will notify team of patients admission. GDMA2: on NPH 25U bedtime and well controlled  - Will give 1/2 dose tonight of 12U  - Latent phase glucose checks every 4 hours with SSI  - Active phase glucose checks every 1 hour with SSI  - Growth 86%ile at 36w    Anemia on iron supplementation. CBC sent  Anxiety on 100mg sertraline  Maternal obesity: BMI 44.  Growth 86%ile  At 36w  RPL followed by Dr. Norris Standing: expectant     Aniyah Casey MD      Signed By: Aniyah Casey MD     2021

## 2021-08-12 ENCOUNTER — ANESTHESIA (OUTPATIENT)
Dept: LABOR AND DELIVERY | Age: 31
End: 2021-08-12
Payer: COMMERCIAL

## 2021-08-12 ENCOUNTER — ANESTHESIA EVENT (OUTPATIENT)
Dept: LABOR AND DELIVERY | Age: 31
End: 2021-08-12
Payer: COMMERCIAL

## 2021-08-12 LAB
GLUCOSE BLD STRIP.AUTO-MCNC: 101 MG/DL (ref 65–117)
GLUCOSE BLD STRIP.AUTO-MCNC: 108 MG/DL (ref 65–117)
GLUCOSE BLD STRIP.AUTO-MCNC: 116 MG/DL (ref 65–117)
GLUCOSE BLD STRIP.AUTO-MCNC: 75 MG/DL (ref 65–117)
GLUCOSE BLD STRIP.AUTO-MCNC: 79 MG/DL (ref 65–117)
GLUCOSE BLD STRIP.AUTO-MCNC: 81 MG/DL (ref 65–117)
GLUCOSE BLD STRIP.AUTO-MCNC: 92 MG/DL (ref 65–117)
SERVICE CMNT-IMP: NORMAL

## 2021-08-12 PROCEDURE — 74011250637 HC RX REV CODE- 250/637: Performed by: OBSTETRICS & GYNECOLOGY

## 2021-08-12 PROCEDURE — 77030014125 HC TY EPDRL BBMI -B: Performed by: ANESTHESIOLOGY

## 2021-08-12 PROCEDURE — 74011250636 HC RX REV CODE- 250/636: Performed by: OBSTETRICS & GYNECOLOGY

## 2021-08-12 PROCEDURE — 74011250636 HC RX REV CODE- 250/636: Performed by: ANESTHESIOLOGY

## 2021-08-12 PROCEDURE — 74011000250 HC RX REV CODE- 250: Performed by: ANESTHESIOLOGY

## 2021-08-12 PROCEDURE — 00HU33Z INSERTION OF INFUSION DEVICE INTO SPINAL CANAL, PERCUTANEOUS APPROACH: ICD-10-PCS | Performed by: ANESTHESIOLOGY

## 2021-08-12 PROCEDURE — 74011250636 HC RX REV CODE- 250/636

## 2021-08-12 PROCEDURE — 77030040830 HC CATH URETH FOL MDII -A

## 2021-08-12 PROCEDURE — A4300 CATH IMPL VASC ACCESS PORTAL: HCPCS

## 2021-08-12 PROCEDURE — 65270000029 HC RM PRIVATE

## 2021-08-12 PROCEDURE — 10907ZC DRAINAGE OF AMNIOTIC FLUID, THERAPEUTIC FROM PRODUCTS OF CONCEPTION, VIA NATURAL OR ARTIFICIAL OPENING: ICD-10-PCS | Performed by: OBSTETRICS & GYNECOLOGY

## 2021-08-12 PROCEDURE — 77030019905 HC CATH URETH INTMIT MDII -A

## 2021-08-12 PROCEDURE — 82962 GLUCOSE BLOOD TEST: CPT

## 2021-08-12 RX ORDER — NALOXONE HYDROCHLORIDE 0.4 MG/ML
0.4 INJECTION, SOLUTION INTRAMUSCULAR; INTRAVENOUS; SUBCUTANEOUS AS NEEDED
Status: DISCONTINUED | OUTPATIENT
Start: 2021-08-12 | End: 2021-08-13 | Stop reason: HOSPADM

## 2021-08-12 RX ORDER — BUPIVACAINE HYDROCHLORIDE 5 MG/ML
30 INJECTION, SOLUTION EPIDURAL; INTRACAUDAL AS NEEDED
Status: DISCONTINUED | OUTPATIENT
Start: 2021-08-12 | End: 2021-08-13 | Stop reason: HOSPADM

## 2021-08-12 RX ORDER — TERBUTALINE SULFATE 1 MG/ML
INJECTION SUBCUTANEOUS
Status: COMPLETED
Start: 2021-08-12 | End: 2021-08-12

## 2021-08-12 RX ORDER — LIDOCAINE HYDROCHLORIDE AND EPINEPHRINE 15; 5 MG/ML; UG/ML
4.5 INJECTION, SOLUTION EPIDURAL AS NEEDED
Status: DISCONTINUED | OUTPATIENT
Start: 2021-08-12 | End: 2021-08-13 | Stop reason: HOSPADM

## 2021-08-12 RX ORDER — SODIUM CHLORIDE, SODIUM LACTATE, POTASSIUM CHLORIDE, CALCIUM CHLORIDE 600; 310; 30; 20 MG/100ML; MG/100ML; MG/100ML; MG/100ML
125 INJECTION, SOLUTION INTRAVENOUS CONTINUOUS
Status: DISCONTINUED | OUTPATIENT
Start: 2021-08-12 | End: 2021-08-15 | Stop reason: HOSPADM

## 2021-08-12 RX ORDER — BUPIVACAINE HYDROCHLORIDE 2.5 MG/ML
INJECTION, SOLUTION EPIDURAL; INFILTRATION; INTRACAUDAL AS NEEDED
Status: DISCONTINUED | OUTPATIENT
Start: 2021-08-12 | End: 2021-08-13 | Stop reason: HOSPADM

## 2021-08-12 RX ORDER — EPHEDRINE SULFATE/0.9% NACL/PF 50 MG/5 ML
10 SYRINGE (ML) INTRAVENOUS
Status: DISCONTINUED | OUTPATIENT
Start: 2021-08-12 | End: 2021-08-13 | Stop reason: HOSPADM

## 2021-08-12 RX ORDER — FENTANYL CITRATE 50 UG/ML
100 INJECTION, SOLUTION INTRAMUSCULAR; INTRAVENOUS ONCE
Status: COMPLETED | OUTPATIENT
Start: 2021-08-12 | End: 2021-08-12

## 2021-08-12 RX ORDER — FENTANYL/BUPIVACAINE/NS/PF 2-1250MCG
1-16 PREFILLED PUMP RESERVOIR EPIDURAL CONTINUOUS
Status: DISCONTINUED | OUTPATIENT
Start: 2021-08-12 | End: 2021-08-13 | Stop reason: HOSPADM

## 2021-08-12 RX ORDER — BUPIVACAINE HYDROCHLORIDE 2.5 MG/ML
INJECTION, SOLUTION EPIDURAL; INFILTRATION; INTRACAUDAL
Status: COMPLETED
Start: 2021-08-12 | End: 2021-08-12

## 2021-08-12 RX ORDER — FENTANYL CITRATE 50 UG/ML
INJECTION, SOLUTION INTRAMUSCULAR; INTRAVENOUS
Status: COMPLETED
Start: 2021-08-12 | End: 2021-08-12

## 2021-08-12 RX ORDER — TERBUTALINE SULFATE 1 MG/ML
0.25 INJECTION SUBCUTANEOUS
Status: COMPLETED | OUTPATIENT
Start: 2021-08-13 | End: 2021-08-12

## 2021-08-12 RX ADMIN — OXYTOCIN 16 MILLI-UNITS/MIN: 10 INJECTION INTRAVENOUS at 11:22

## 2021-08-12 RX ADMIN — SODIUM CHLORIDE, POTASSIUM CHLORIDE, SODIUM LACTATE AND CALCIUM CHLORIDE 125 ML/HR: 600; 310; 30; 20 INJECTION, SOLUTION INTRAVENOUS at 12:12

## 2021-08-12 RX ADMIN — SODIUM CHLORIDE, POTASSIUM CHLORIDE, SODIUM LACTATE AND CALCIUM CHLORIDE 500 ML: 600; 310; 30; 20 INJECTION, SOLUTION INTRAVENOUS at 16:49

## 2021-08-12 RX ADMIN — SODIUM CHLORIDE, POTASSIUM CHLORIDE, SODIUM LACTATE AND CALCIUM CHLORIDE 125 ML/HR: 600; 310; 30; 20 INJECTION, SOLUTION INTRAVENOUS at 04:10

## 2021-08-12 RX ADMIN — TERBUTALINE SULFATE 0.25 MG: 1 INJECTION SUBCUTANEOUS at 23:54

## 2021-08-12 RX ADMIN — OXYTOCIN 18 MILLI-UNITS/MIN: 10 INJECTION INTRAVENOUS at 11:54

## 2021-08-12 RX ADMIN — FENTANYL CITRATE 100 MCG: 50 INJECTION, SOLUTION INTRAMUSCULAR; INTRAVENOUS at 17:22

## 2021-08-12 RX ADMIN — Medication 10 ML/HR: at 17:33

## 2021-08-12 RX ADMIN — SERTRALINE 100 MG: 50 TABLET, FILM COATED ORAL at 09:17

## 2021-08-12 RX ADMIN — BUPIVACAINE HYDROCHLORIDE 10 ML: 2.5 INJECTION, SOLUTION EPIDURAL; INFILTRATION; INTRACAUDAL; PERINEURAL at 17:22

## 2021-08-12 RX ADMIN — OXYTOCIN 2 MILLI-UNITS/MIN: 10 INJECTION INTRAVENOUS at 04:10

## 2021-08-12 NOTE — ROUTINE PROCESS
0730: Bedside shift change report given to [address] [place] [address] [place] (oncoming nurse) by Sunny Brown RN (offgoing nurse). Report included the following information SBAR, Intake/Output, MAR and Recent Results. 0805: Dr Grey Schulz at bedside, Melo balloon deflated an removed without difficulty. SVE 4/70/-3. AROM for moderate amount of clear fluid. FSE applied. 1325: verbal from Dr Grey Schulz to increase to 25 mu/hour. 1400: patient on birthing ball, difficult to monitor contractions. 1525: spoke with Dr Grey Schulz regarding [address], instructed to turn Pitocin back to 20mu/hr. 1600: patient swaying at bed, difficult to monitor contractions. 1642: Dr Grey Schulz at bedside, [address] 6/90/-2. Patient requesting epidural.  Fluid bolus started. Blood sugar check in q2, then hourly. 1705: Dr Alexander Angeles at bedside for epidural placement.

## 2021-08-12 NOTE — ANESTHESIA PROCEDURE NOTES
Epidural Block    Patient location during procedure: OB  Reason for block: labor epidural  Staffing  Performed: attending   Preanesthetic Checklist  Completed: patient identified, IV checked, site marked, risks and benefits discussed, surgical consent, monitors and equipment checked, pre-op evaluation and timeout performed  Block Placement  Patient position: sitting  Prep: ChloraPrep  Sterility prep: cap, drape, gloves and mask  Sedation level: no sedation  Patient monitoring: continuous pulse oximetry and heart rate  Approach: midline  Location: lumbar  Lumbar location: L3-L4  Epidural  Loss of resistance technique: air  Guidance: landmark technique  Needle  Needle type: Tuohy   Needle gauge: 17 G  Needle length: 9 cm  Needle insertion depth: 6.5 cm  Catheter type: end hole  Catheter size: 19 G  Catheter at skin depth: 11.5 cm  Catheter securement method: clear occlusive dressing and surgical tape  Test dose: negative  Assessment  Sensory level: T10  Block outcome: pain improved  Number of attempts: 1  Procedure assessment: patient tolerated procedure well with no immediate complications

## 2021-08-12 NOTE — PROGRESS NOTES
S: Pt seen, examined and chart reviewed. Remains comfortable. O:   Visit Vitals  /69 (BP 1 Location: Left upper arm, BP Patient Position: At rest)   Pulse 82   Temp 97.8 °F (36.6 °C)   Resp 16   Ht 5' 5\" (1.651 m)   Wt 127 kg (280 lb)   SpO2 99%   Breastfeeding No   BMI 46.59 kg/m²     Patient Vitals for the past 4 hrs: Mode Fetal Heart Rate Fetal Activity Variability Decelerations Accelerations   21 0715 External 125 -- 6-25 BPM -- --   21 0700 External 135 -- 6-25 BPM None Yes   21 0630 External 130 -- 6-25 BPM None Yes   21 0615 External 120 -- 6-25 BPM -- --   21 0600 External 120 Present 6-25 BPM None Yes   21 0545 External 120 -- 6-25 BPM None Yes   21 0534 US Readjusted -- -- -- -- --   21 0530 External 130 -- 6-25 BPM None Yes   21 0515 External 120 -- 6-25 BPM -- --   21 0500 External 120 -- 6-25 BPM None Yes   21 0445 US Readjusted; External 125 -- 6-25 BPM -- --   21 0430 External 125 -- 6-25 BPM None Yes     Moca: every 2-4min on pit 12mu    Gen - NAD  Resp - nl effort  Abd - gravid, non-tender  Cervix - 60/-3 AROM with return of clear fluid, FSE placed for improved fetal monitoring    Recent Results (from the past 12 hour(s))   GLUCOSE, POC    Collection Time: 21  9:57 PM   Result Value Ref Range    Glucose (POC) 95 65 - 117 mg/dL    Performed by Mark Martinez    GLUCOSE, POC    Collection Time: 21  4:14 AM   Result Value Ref Range    Glucose (POC) 81 65 - 117 mg/dL    Performed by Mark Martinez          A/P:  at 39w2d undergoing IOL for GDMA2 well controlled, baby with VSD x 2 and cleft palate. GBS neg. S/p balloon ripening on pitocin, now s/p AROM.     - cont pitocin titration to achieve contractions every 2-3min  - NICU aware  - will make sure craniofacial team is aware of impending delivery

## 2021-08-12 NOTE — PROGRESS NOTES
@6264 Bedside shift change report given to ALEKSEY Parson RN (oncoming nurse) by Stephanie Sullivan. Antonia Irwin RN (offgoing nurse). Report included the following information SBAR, Kardex, Intake/Output, MAR and Recent Results. @6103 Per MD ok to do q4 hour blood sugar checks while awake. @7914 Patient would like to wait on IV pain medications at this time, knows to call out if she changes her mind.

## 2021-08-12 NOTE — PROGRESS NOTES
Labor Progress Note    Assumed care of patient and chart reviewed, fetal heart rate and contraction pattern evaluated. Physical Exam:  CEFM 150bpm, mod variability, +accels, no decelerations  Nord: irregular contractions    Assessment/Plan:  Reassuring fetal status. Will continue to observe overnight for labor.         Arsen Stapleton MD

## 2021-08-12 NOTE — ANESTHESIA PREPROCEDURE EVALUATION
Anesthetic History   No history of anesthetic complications            Review of Systems / Medical History  Patient summary reviewed, nursing notes reviewed and pertinent labs reviewed    Pulmonary  Within defined limits                 Neuro/Psych   Within defined limits           Cardiovascular  Within defined limits                Exercise tolerance: >4 METS     GI/Hepatic/Renal     GERD: well controlled           Endo/Other    Diabetes    Morbid obesity     Other Findings   Comments: Missed Spontaneous            Physical Exam    Airway  Mallampati: II  TM Distance: 4 - 6 cm  Neck ROM: normal range of motion   Mouth opening: Normal     Cardiovascular    Rhythm: regular  Rate: normal         Dental  No notable dental hx       Pulmonary  Breath sounds clear to auscultation               Abdominal  GI exam deferred       Other Findings            Anesthetic Plan    ASA: 3  Anesthesia type: epidural          Induction: Intravenous  Anesthetic plan and risks discussed with: Patient and Spouse

## 2021-08-13 LAB
GLUCOSE BLD STRIP.AUTO-MCNC: 81 MG/DL (ref 65–117)
GLUCOSE BLD STRIP.AUTO-MCNC: 86 MG/DL (ref 65–117)
GLUCOSE BLD STRIP.AUTO-MCNC: 92 MG/DL (ref 65–117)
GLUCOSE BLD STRIP.AUTO-MCNC: 97 MG/DL (ref 65–117)
SERVICE CMNT-IMP: NORMAL

## 2021-08-13 PROCEDURE — 82962 GLUCOSE BLOOD TEST: CPT

## 2021-08-13 PROCEDURE — 65410000002 HC RM PRIVATE OB

## 2021-08-13 PROCEDURE — 0KQM0ZZ REPAIR PERINEUM MUSCLE, OPEN APPROACH: ICD-10-PCS | Performed by: OBSTETRICS & GYNECOLOGY

## 2021-08-13 PROCEDURE — 75410000000 HC DELIVERY VAGINAL/SINGLE

## 2021-08-13 PROCEDURE — 74011250637 HC RX REV CODE- 250/637: Performed by: OBSTETRICS & GYNECOLOGY

## 2021-08-13 PROCEDURE — 75410000003 HC RECOV DEL/VAG/CSECN EA 0.5 HR

## 2021-08-13 PROCEDURE — 76060000078 HC EPIDURAL ANESTHESIA

## 2021-08-13 PROCEDURE — 74011000250 HC RX REV CODE- 250: Performed by: ANESTHESIOLOGY

## 2021-08-13 PROCEDURE — 74011250636 HC RX REV CODE- 250/636: Performed by: OBSTETRICS & GYNECOLOGY

## 2021-08-13 PROCEDURE — 75410000002 HC LABOR FEE PER 1 HR

## 2021-08-13 PROCEDURE — 59200 INSERT CERVICAL DILATOR: CPT

## 2021-08-13 RX ORDER — DIPHENHYDRAMINE HCL 25 MG
25 CAPSULE ORAL
Status: DISCONTINUED | OUTPATIENT
Start: 2021-08-13 | End: 2021-08-15 | Stop reason: HOSPADM

## 2021-08-13 RX ORDER — SODIUM CHLORIDE 0.9 % (FLUSH) 0.9 %
5-40 SYRINGE (ML) INJECTION AS NEEDED
Status: DISCONTINUED | OUTPATIENT
Start: 2021-08-13 | End: 2021-08-15 | Stop reason: HOSPADM

## 2021-08-13 RX ORDER — OXYCODONE AND ACETAMINOPHEN 5; 325 MG/1; MG/1
2 TABLET ORAL
Status: DISCONTINUED | OUTPATIENT
Start: 2021-08-13 | End: 2021-08-15 | Stop reason: HOSPADM

## 2021-08-13 RX ORDER — AMMONIA 15 % (W/V)
1 AMPUL (EA) INHALATION AS NEEDED
Status: DISCONTINUED | OUTPATIENT
Start: 2021-08-13 | End: 2021-08-15 | Stop reason: HOSPADM

## 2021-08-13 RX ORDER — ONDANSETRON 4 MG/1
4 TABLET, ORALLY DISINTEGRATING ORAL
Status: DISCONTINUED | OUTPATIENT
Start: 2021-08-13 | End: 2021-08-15 | Stop reason: HOSPADM

## 2021-08-13 RX ORDER — OXYTOCIN/RINGER'S LACTATE 30/500 ML
87.3 PLASTIC BAG, INJECTION (ML) INTRAVENOUS AS NEEDED
Status: DISCONTINUED | OUTPATIENT
Start: 2021-08-13 | End: 2021-08-15 | Stop reason: HOSPADM

## 2021-08-13 RX ORDER — ZOLPIDEM TARTRATE 5 MG/1
5 TABLET ORAL
Status: DISCONTINUED | OUTPATIENT
Start: 2021-08-13 | End: 2021-08-15 | Stop reason: HOSPADM

## 2021-08-13 RX ORDER — ACETAMINOPHEN 325 MG/1
650 TABLET ORAL
Status: DISCONTINUED | OUTPATIENT
Start: 2021-08-13 | End: 2021-08-15 | Stop reason: HOSPADM

## 2021-08-13 RX ORDER — OXYTOCIN/RINGER'S LACTATE 30/500 ML
10 PLASTIC BAG, INJECTION (ML) INTRAVENOUS AS NEEDED
Status: DISCONTINUED | OUTPATIENT
Start: 2021-08-13 | End: 2021-08-15 | Stop reason: HOSPADM

## 2021-08-13 RX ORDER — DOCUSATE SODIUM 100 MG/1
100 CAPSULE, LIQUID FILLED ORAL
Status: DISCONTINUED | OUTPATIENT
Start: 2021-08-13 | End: 2021-08-15 | Stop reason: HOSPADM

## 2021-08-13 RX ORDER — SODIUM CHLORIDE 0.9 % (FLUSH) 0.9 %
5-40 SYRINGE (ML) INJECTION EVERY 8 HOURS
Status: DISCONTINUED | OUTPATIENT
Start: 2021-08-13 | End: 2021-08-15 | Stop reason: HOSPADM

## 2021-08-13 RX ORDER — SIMETHICONE 80 MG
80 TABLET,CHEWABLE ORAL
Status: DISCONTINUED | OUTPATIENT
Start: 2021-08-13 | End: 2021-08-15 | Stop reason: HOSPADM

## 2021-08-13 RX ORDER — OXYCODONE AND ACETAMINOPHEN 5; 325 MG/1; MG/1
1 TABLET ORAL
Status: DISCONTINUED | OUTPATIENT
Start: 2021-08-13 | End: 2021-08-15 | Stop reason: HOSPADM

## 2021-08-13 RX ORDER — CALCIUM CARBONATE 200(500)MG
400 TABLET,CHEWABLE ORAL
Status: DISCONTINUED | OUTPATIENT
Start: 2021-08-13 | End: 2021-08-15 | Stop reason: HOSPADM

## 2021-08-13 RX ORDER — HYDROCORTISONE ACETATE PRAMOXINE HCL 2.5; 1 G/100G; G/100G
CREAM TOPICAL AS NEEDED
Status: DISCONTINUED | OUTPATIENT
Start: 2021-08-13 | End: 2021-08-15 | Stop reason: HOSPADM

## 2021-08-13 RX ORDER — HYDROCORTISONE 1 %
CREAM (GRAM) TOPICAL AS NEEDED
Status: DISCONTINUED | OUTPATIENT
Start: 2021-08-13 | End: 2021-08-15 | Stop reason: HOSPADM

## 2021-08-13 RX ORDER — IBUPROFEN 400 MG/1
800 TABLET ORAL EVERY 8 HOURS
Status: DISCONTINUED | OUTPATIENT
Start: 2021-08-13 | End: 2021-08-15 | Stop reason: HOSPADM

## 2021-08-13 RX ADMIN — OXYTOCIN 2 MILLI-UNITS/MIN: 10 INJECTION INTRAVENOUS at 03:38

## 2021-08-13 RX ADMIN — CALCIUM CARBONATE (ANTACID) CHEW TAB 500 MG 400 MG: 500 CHEW TAB at 02:02

## 2021-08-13 RX ADMIN — IBUPROFEN 800 MG: 400 TABLET, FILM COATED ORAL at 20:32

## 2021-08-13 RX ADMIN — Medication 10 ML/HR: at 02:50

## 2021-08-13 RX ADMIN — IBUPROFEN 800 MG: 400 TABLET, FILM COATED ORAL at 12:21

## 2021-08-13 NOTE — PROGRESS NOTES
OB Hospitalist    Called to the bedside to review strip decels to the 90s. Resuscitative measures initiated- Pitocin turned off @ 24 milliunit's, Fluid bolus,  Oxygen inhalation, Terbutaline SQ x 1 dose. Recovery to baseline noted Baseline 130, moderate variability with accels noted. Cervical check done by RN 6 cm   Spoke with pt and  at the bedside  - will give a pitocin break for 2 hrs.       Gabe Knight MD

## 2021-08-13 NOTE — PROGRESS NOTES
Labor Progress Note  Assumed care from Dr. Perry .  at 39w2d undergoing IOL for GDMA2 well controlled, baby with VSD x 2 and cleft palate. GBS neg. S/p balloon ripening on pitocin, now s/p AROM. Now comfortable with Epidural.  Patient seen, fetal heart rate and contraction pattern evaluated. VSS AF  Physical Exam:  Cervical Exam: 5-6/80/-1  Membranes: AROM: IUPC in place  Uterine Activity: Frequency: Irregular  Fetal Heart Rate: Reactive  Accelerations: yes  Decelerations: variable  Uterine contractions: irregular    Assessment/Plan:  Reassuring fetal status.   Continue to titrate Pitocin  NICU aware  Hourly BS check     Max Nguyen MD

## 2021-08-13 NOTE — PROGRESS NOTES
Labor Progress Note  Patient seen, fetal heart rate and contraction pattern evaluated. Comfortable with Epidural    VSS AF    Physical Exam:  Cervical Exam: Lip/0 per RN  Membranes:  AROM  Uterine Activity: Frequency: regular  Fetal Heart Rate: Reactive  Accelerations: yes  Decelerations: variable  Uterine contractions: every 2-3 mins    Assessment/Plan:  Reassuring fetal status.    Acuchecks 80-97mg/dl  On 8 milliunits of Pitocin  Positional changes advised  Will continue to monitor for labor adequacy    Zahira Azul MD

## 2021-08-13 NOTE — PROGRESS NOTES
3649 Bedside and Verbal shift change report given to Irl Hammans, RN (oncoming nurse) by Diego Anderson RN (offgoing nurse). Report included the following information SBAR, Procedure Summary, Intake/Output, MAR, Recent Results and Med Rec Status. 0830 RN called to bedside, patient states she feels the urge to push. SVE, 10/100/+1. Will notify OB and prepare patient to begin pushing. 1005 Spontaneous vaginal delivery of live infant male, placed immediately skin to skin on patient's chest. NICU at bedside. 18 RN assisted patient up to bathroom, patient able to void without difficulty and steady on feet. 1220  TRANSFER - OUT REPORT:    Verbal report given to Mia Diehl RN (name) on Jade White  being transferred to [facility] (unit) for routine progression of care       Report consisted of patients Situation, Background, Assessment and   Recommendations(SBAR). Information from the following report(s) SBAR, Procedure Summary, Intake/Output, MAR, Recent Results and Med Rec Status was reviewed with the receiving nurse. Lines:   Peripheral IV 08/11/21 Left Wrist (Active)   Site Assessment Clean, dry, & intact 08/13/21 0800   Phlebitis Assessment 0 08/13/21 0800   Infiltration Assessment 0 08/13/21 0800   Dressing Status Clean, dry, & intact 08/13/21 0800   Dressing Type Tape;Transparent 08/13/21 0800   Hub Color/Line Status Pink; Infusing 08/13/21 0800   Action Taken Blood drawn 08/11/21 1706   Alcohol Cap Used Yes 08/11/21 1706        Opportunity for questions and clarification was provided.       Patient transported with:   Registered Nurse

## 2021-08-13 NOTE — LACTATION NOTE
This note was copied from a baby's chart. Initial  Lactation Consultation -Baby born vaginally this morning to a  mom at 44 3/7 weeks gestation. Mom noticed breast changes during her pregnancy. Mom has gestational diabetes. She was on metformin and then began insulin at night at 30 weeks. Mom states baby latched and nursed well right after birth. I helped mom with a feeding. Baby was able to get a deep latch. He began sucking rhythmically with frequent, audible swallows. He nursed for 20 minutes and then fell asleep. Feeding Plan: Mother will keep baby skin to skin as often as possible, feed on demand, respond to feeding cues, obtain latch, listen for audible swallowing, be aware of signs of oxytocin release/ cramping, thirst and sleepiness while breastfeeding. Mom will not limit the time the baby is at the breast. She will allow baby to completely finish one breast and then offer the second breast at each feeding.

## 2021-08-13 NOTE — PROGRESS NOTES
@9302 Bedside shift change report given to ALEKSEY Rivera RN (oncoming nurse) by WU Jeff RN (offgoing nurse). Report included the following information SBAR, Kardex, Intake/Output, MAR and Recent Results .      @6512 Exaggerated/lunge right side lying with peanut ball    @2213 Melo placed    @2221 Exaggerated/lunge left side lying with peanut ball

## 2021-08-13 NOTE — PROGRESS NOTES
TRANSFER - IN REPORT:    Verbal report received from KEMAR Odom RN (name) on Christell Showers  being received from L&D (unit) for routine progression of care      Report consisted of patients Situation, Background, Assessment and   Recommendations(SBAR). Information from the following report(s) SBAR was reviewed with the receiving nurse. Opportunity for questions and clarification was provided. Assessment completed upon patients arrival to unit and care assumed.

## 2021-08-13 NOTE — L&D DELIVERY NOTE
Delivery Summary    Patient: Allyn Hawk MRN: [id number]  SSN: [id number]    YOB: 1990  Age: 32 y.o. Sex: female       Information for the patient's :  Edwige Duffy [[id number]]       Labor Events:    Labor: No    Steroids: None   Cervical Ripening Date/Time: 2021 4:30 PM   Cervical Ripening Type: Melo/EASI   Antibiotics During Labor: No   Rupture Identifier:      Rupture Date/Time: 2021 8:06 AM   Rupture Type: AROM   Amniotic Fluid Volume: Large    Amniotic Fluid Description: Clear    Amniotic Fluid Odor: None    Induction:         Induction Date/Time: 2021 4:10 AM    Indications for Induction: Diabetes    Augmentation:     Augmentation Date/Time:      Indications for Augmentation:     Labor complications: Additional complications:        Delivery Events:  Indications For Episiotomy:     Episiotomy:     Perineal Laceration(s):     Repaired:     Periurethral Laceration Location:      Repaired:     Labial Laceration Location:     Repaired:     Sulcal Laceration Location:     Repaired:     Vaginal Laceration Location:     Repaired:     Cervical Laceration Location:     Repaired:     Repair Suture:     Number of Repair Packets:     Estimated Blood Loss (ml):  ml   Quantitative Blood Loss (ml)                Delivery Date: 2021    Delivery Time: 10:05 AM  Delivery Type: Vaginal, Spontaneous  Sex:  Male    Gestational Age: 38w3d   Delivery Clinician:  Ruth Menjivar  Living Status: Living   Delivery Location: L&D            APGARS  One minute Five minutes Ten minutes   Skin color:            Heart rate:            Grimace:            Muscle tone:            Breathing: Totals:                Presentation: Vertex    Position:        Resuscitation Method:  Suctioning-bulb;Suctioning-deep; Tactile Stimulation;C-PAP     Meconium Stained: None      Cord Information:    Complications:    Cord around:    Delayed cord clamping?     Cord clamped date/time:   Disposition of Cord Blood: Discard    Blood Gases Sent?:      Placenta:  Date/Time: 2021 10:08 AM  Removal: Spontaneous      Appearance: Normal     Surprise Measurements:  Birth Weight:        Birth Length:        Head Circumference:        Chest Circumference:       Abdominal Girth: Other Providers:   SANDHYA CLARKE;KEYONA BIGGS EA;JESSE SHEPHERD;BENNY REIS;;;;MATI BHATTI, Obstetrician;Primary Nurse;Primary  Nurse;Nicu Nurse;Neonatologist;Anesthesiologist;Crna;Nurse Practitioner;Midwife;Nursery Nurse             Viable male infant was delivered spontaneously. Fetal head was delivered atraumatically in KALEN position. Shoulders and body delivered without complications. Infant was then placed on mother's abdomen. Infant's mouth and nares were bulb suctioned. Cord was clamped x2 after 1 minute delay and was then cut by father of baby. Placenta was delivered with controlled cord traction and upon inspection was found to be intact with 3vc. Bimanual massage and IV pitocin bolus were administered. Fundus noted to be firm. 2nd deg perineal lac was noted and repaired in routine fashion. Excellent hemostasis was assured.  cc. No complications. Group B Strep:   Lab Results   Component Value Date/Time    Darontreberlin, External negative 2021 12:00 AM     Information for the patient's :  Natty Dyer [[id number]]   No results found for: ABORH, PCTABR, PCTDIG, BILI, ABORHEXT, ABORH     No results for input(s): PCO2CB, PO2CB, HCO3I, SO2I, IBD, PTEMPI, SPECTI, PHICB, ISITE, IDEV, IALLEN in the last 72 hours.

## 2021-08-13 NOTE — PROGRESS NOTES
Pt seen and examined, electronic chart was reviewed, and case was discussed at am rounds. Cat 1 tracing. C/C/+2. Will begin pushing. Anticipate .

## 2021-08-14 LAB
GLUCOSE BLD STRIP.AUTO-MCNC: 95 MG/DL (ref 65–117)
SERVICE CMNT-IMP: NORMAL

## 2021-08-14 PROCEDURE — 65410000002 HC RM PRIVATE OB

## 2021-08-14 PROCEDURE — 74011250637 HC RX REV CODE- 250/637: Performed by: OBSTETRICS & GYNECOLOGY

## 2021-08-14 PROCEDURE — 82962 GLUCOSE BLOOD TEST: CPT

## 2021-08-14 RX ORDER — IBUPROFEN 800 MG/1
800 TABLET ORAL
Qty: 30 TABLET | Refills: 0 | Status: SHIPPED | OUTPATIENT
Start: 2021-08-14 | End: 2021-09-28 | Stop reason: ALTCHOICE

## 2021-08-14 RX ADMIN — ACETAMINOPHEN 650 MG: 325 TABLET ORAL at 02:19

## 2021-08-14 RX ADMIN — IBUPROFEN 800 MG: 400 TABLET, FILM COATED ORAL at 08:18

## 2021-08-14 RX ADMIN — IBUPROFEN 800 MG: 400 TABLET, FILM COATED ORAL at 18:55

## 2021-08-14 RX ADMIN — SERTRALINE 100 MG: 50 TABLET, FILM COATED ORAL at 08:18

## 2021-08-14 RX ADMIN — DOCUSATE SODIUM 100 MG: 100 CAPSULE ORAL at 18:55

## 2021-08-14 NOTE — ROUTINE PROCESS
Bedside shift change report given to Michelle Guzman RN (oncoming nurse) by Ascencion Ramon RN (offgoing nurse). Report included the following information SBAR.

## 2021-08-14 NOTE — LACTATION NOTE
This note was copied from a baby's chart. Mom called out asking for assistance with breast feeding. Baby is very sleepy this morning post circumcision. Mom said she has been struggling some to get baby latched properly. She has some bruises on both nipples. She briefly tried a nipple shield last night with out success. Mom has small, slightly conical breasts. I helped mom with a feeding. We were able to get him latched in the prone position but he only sucked a few times before falling asleep. We tried a little sweetease to get him sucking but he would not wake up. I helped mom with hand expression and we were able to express 15 drops of colostrum that we spoon fed to the baby. Mom will continue to offer the breast as baby wakes.

## 2021-08-14 NOTE — DISCHARGE SUMMARY
Obstetrical Discharge Summary     Name: Nida Ames MRN: [id number]  SSN: [id number]    YOB: 1990  Age: 32 y.o. Sex: female      Admit Date: 2021    Discharge Date: 2021     Admitting Physician: Mandie Nation MD     Attending Physician:  Raj Sterling MD     Admission Diagnoses: Encounter for induction of labor [Z34.90]    Discharge Diagnoses:   Information for the patient's :  Eduarda Mendoza [[id number]]   Delivery of a 3.805 kg male infant via Vaginal, Spontaneous on 2021 at 10:05 AM  by Trini Varela. Apgars were 8  and 9 . Additional Diagnoses:   Hospital Problems  Date Reviewed: 2020        Codes Class Noted POA    Encounter for induction of labor ICD-10-CM: Z34.90  ICD-9-CM: V22.1  2021 Unknown             Lab Results   Component Value Date/Time    Rubella, External immune 2021 12:00 AM    GrBStrep, External negative 2021 12:00 AM       Hospital Course: Normal hospital course following the delivery. Patient Instructions:   Current Discharge Medication List      CONTINUE these medications which have CHANGED    Details   ibuprofen (MOTRIN) 800 mg tablet Take 1 Tablet by mouth every eight (8) hours as needed for Pain. Qty: 30 Tablet, Refills: 0         CONTINUE these medications which have NOT CHANGED    Details   cholecalciferol, vitamin D3, (VITAMIN D3 PO) Take 1 Tablet by mouth. !! sertraline (ZOLOFT) 50 mg tablet Take 1.5 Tabs by mouth daily. Qty: 45 Tab, Refills: 0      [id number] raven-folic-dha (PRENATAL DHA+COMPLETE PRENATAL) -300 mg-mcg-mg cmpk Take 1 Tab by mouth. cetirizine (ZYRTEC) 10 mg tablet Take 10 mg by mouth daily. !! sertraline (ZOLOFT) 50 mg tablet TAKE ONE AND ONE-HALF TABLETS BY MOUTH EVERY DAY  Qty: 135 Tab, Refills: 0      famotidine (Pepcid) 20 mg tablet Take 20 mg by mouth two (2) times a day. !! - Potential duplicate medications found. Please discuss with provider.       STOP taking these medications       insulin NPH (HUMULIN N) 100 unit/mL (3 mL) inpn Comments:   Reason for Stopping:         aspirin 81 mg chewable tablet Comments:   Reason for Stopping:         metFORMIN (GLUCOPHAGE) 1,000 mg tablet Comments:   Reason for Stopping:               Disposition at Discharge: Home or self care    Condition at Discharge: Stable    Reference my discharge instructions.     Follow-up Appointments   Procedures    FOLLOW UP VISIT Appointment in: 6 Weeks     Standing Status:   Standing     Number of Occurrences:   1     Order Specific Question:   Appointment in     Answer:   6 Weeks        Signed By:  Sheldon Leblanc MD     August 14, 2021

## 2021-08-14 NOTE — ROUTINE PROCESS
Bedside shift change report given to Daysi Logan RN (oncoming nurse) by Laura Mandel RN (offgoing nurse). Report included the following information SBAR, Procedure Summary, Intake/Output and Recent Results.

## 2021-08-14 NOTE — PROGRESS NOTES
Post-Partum Day Number 1 Progress Note    Christie Jorge     Assessment: Doing well, post partum day 1    Plan:  - Continue routine postpartum and perineal care as well as maternal education.  - The risks and benefits of the circumcision  procedure and anesthesia including: bleeding, infection, variability of cosmetic results were discussed at length with the mother. She is aware that future repeat procedures may be necessary. She gives informed consent to proceed as noted and her questions are answered. -GDMA2: needs 2 hour GTT at [facility]  - Plan discharge home [address] Discharge Date: Tomorrow. Information for the patient's :  Yanet Akbar [[id number]]   Vaginal, Spontaneous    Patient doing well without significant complaint. Voiding without difficulty, normal lochia. Vitals:  Visit Vitals  /76 (BP 1 Location: Left upper arm, BP Patient Position: At rest)   Pulse 92   Temp 98.2 °F (36.8 °C)   Resp 14   Ht 5' 5\" (1.651 m)   Wt 127 kg (280 lb)   SpO2 97%   Breastfeeding Unknown   BMI 46.59 kg/m²     Temp (24hrs), Av.6 °F (37 °C), Min:97.6 °F (36.4 °C), Max:100.3 °F (37.9 °C)        Exam:   Patient without distress. Fundus firm, nontender per nursing fundal checks. Perineum with normal lochia noted per nursing assessment. Lower extremities are negative for pathological edema.     Labs:     Lab Results   Component Value Date/Time    WBC 9.4 2021 05:07 PM    WBC 7.1 2020 03:49 PM    WBC 7.1 2018 10:12 AM    HGB 11.0 (L) 2021 05:07 PM    HGB 12.6 2020 03:49 PM    HGB 12.8 2018 10:12 AM    HCT 33.7 (L) 2021 05:07 PM    HCT 38.3 2020 03:49 PM    HCT 38.6 2018 10:12 AM    PLATELET 509  05:07 PM    PLATELET 244  03:49 PM    PLATELET 546  10:12 AM       Recent Results (from the past 24 hour(s))   GLUCOSE, POC    Collection Time: 21  8:05 AM   Result Value Ref Range    Glucose (POC) 92 65 - 117 mg/dL    Performed by Mervat Hendricks 79, POC    Collection Time: 08/14/21  7:03 AM   Result Value Ref Range    Glucose (POC) 95 65 - 117 mg/dL    Performed by Mary Deras

## 2021-08-15 VITALS
SYSTOLIC BLOOD PRESSURE: 120 MMHG | TEMPERATURE: 97.9 F | HEART RATE: 92 BPM | BODY MASS INDEX: 46.65 KG/M2 | RESPIRATION RATE: 14 BRPM | OXYGEN SATURATION: 96 % | DIASTOLIC BLOOD PRESSURE: 79 MMHG | WEIGHT: 280 LBS | HEIGHT: 65 IN

## 2021-08-15 PROCEDURE — 74011250637 HC RX REV CODE- 250/637: Performed by: OBSTETRICS & GYNECOLOGY

## 2021-08-15 RX ADMIN — IBUPROFEN 800 MG: 400 TABLET, FILM COATED ORAL at 06:53

## 2021-08-15 RX ADMIN — SERTRALINE 100 MG: 50 TABLET, FILM COATED ORAL at 09:28

## 2021-08-15 NOTE — ROUTINE PROCESS
Bedside shift change report given to BAL Cohen (oncoming nurse) by Colleen Rosa RN (offgoing nurse). Report included the following information SBAR.     1200-Pt discharged home with family. Discharge instructions and medication times reviewed. Family verbalized understanding. Signature obtained on paper and placed on pt's chart.

## 2021-08-15 NOTE — LACTATION NOTE
This note was copied from a baby's chart. Mom and baby scheduled for discharge today. I did not see the baby at the breast. Mom states the baby has been latching and nursing well. Mom says baby was cluster feeding during the night. She said he latches well and she is hearing some swallowing. Baby's weight loss is 6.5% this morning (39 hours of life) 24 hour weight loss 4.8%. [de-identified] bili is 8.1 in the low intermediate risk zone. We reviewed cluster feeding. Frequent feeding during the brief behavioral phase preceeding milk transition is called cluster feeding. Typical  behavior: baby becomes vigorous at the breast and wants to feed frequently- every 1-2 hours for several feedings. Emptying of the breast twice produces double in subsequent feedings. This is the normal process by which the baby demands his/her supply. This type of frequent feeding is the stimulation which causes lactogenesis II (milk coming in). We discussed engorgement. Breasts may become engorged when milk \"comes in\". How milk is made / normal phases of milk production, supply and demand discussed. Taught care of engorged breasts - frequent breastfeeding encouraged. Mom should put the baby to the breast and allow him to completely finish one breast before offering the second breast. She may pump a couple minutes after nursing for comfort. She can apply ice to the breasts for 10-15 minutes after nursing as needed. Breast feeding teaching completed and all questions answered. Mom will follow up with her pediatrician tomorrow.

## 2021-08-15 NOTE — DISCHARGE INSTRUCTIONS
Patient Education        Learning About Birth Control After Childbirth  What is birth control? Birth control (contraception) is any method used to prevent pregnancy. Wait until you're healed before you have sexual intercourse. This takes about 4 to 6 weeks. If you have sex without birth control, there is a chance that you could get pregnant. This is true even if you haven't started having periods again. Even if you breastfeed, you can still get pregnant. Most experts suggest waiting at least 18 months to get pregnant again. This can reduce risks for you and the baby. There may be reasons for you to get pregnant sooner, though. So talk with your doctor about the risks and benefits. The only sure way to not get pregnant is to not have sex. But finding a good method of birth control that you're comfortable with can help you avoid an unplanned pregnancy. Your doctor can help you choose the birth control method that's right for you. What are the types of birth control? · Long-acting reversible contraception (LARC). This is the most effective reversible method you can use to prevent pregnancy. LARCs are implants and intrauterine devices (IUDs). While they are being used, they usually prevent pregnancy for years. If you decide you want to get pregnant, you can have them removed. ? Implants are placed under the skin of the arm. This can be done right after you give birth. They release the hormone progestin. They prevent pregnancy for about 3 years. ? IUDs are placed in the uterus by a doctor. This can be done right after you give birth, if you and your doctor discuss it beforehand. Or it can be done at a doctor visit later. There are two main types of IUDs--the copper IUD and the hormonal IUD. The hormonal IUD releases progestin. IUDs prevent pregnancy for 3 to 10 years, depending on the type. · Hormonal methods. They are very good at preventing pregnancy.  Combination birth control pills (\"the pill\"), skin patches, and vaginal rings release the hormones estrogen and progestin. Depo-Provera is a shot you get every 3 months. Shots, mini-pills, IUDs, and implants release progestin only. It's best to use progestin-only options in the first few weeks after you give birth. · Barrier methods. These don't prevent pregnancy as well as implants, IUDs, or hormonal methods do. Barrier methods include condoms, diaphragms, and cervical caps. You must use them every time you have sex. If you had a diaphragm or cervical cap before you got pregnant, talk to your doctor to see if you need a different size. Condoms can be used anytime after you give birth. · Natural family planning. This is also known as fertility awareness or the rhythm method. It can work if you and your partner are very careful and you have a regular ovulation cycle. But it doesn't work better than other birth control methods. You will need to keep good records so you know when you are most likely to become pregnant. And during those times, you will need to use a barrier method or not have sex. · Permanent birth control (sterilization). It gives you lasting protection against pregnancy. A man can have a vasectomy. A woman can have her tubes tied (tubal ligation). But this is only a good choice if you are sure that you don't want any more children. · Emergency contraception. This is a backup method to prevent pregnancy if you didn't use birth control or if a condom breaks. The most effective emergency contraception is prescribed by a doctor. This includes the copper IUD (inserted by a doctor) or a prescription pill. You can also get emergency contraceptive pills without a prescription at most drugstores. How can you get birth control? · You can buy:  ? Condoms and spermicides without a prescription. You can get them in drugstores, online, and in many grocery stores. ? Some forms of emergency contraception without a prescription.  You can get these at most drugstores. · You need to see a doctor or visit family planning clinic to:  ? Get a prescription for birth control pills and other methods that use hormones. ? Have an implant or IUD inserted. This includes the type of IUD used for emergency contraception. ? Get a hormone shot. ? Get a prescription for a diaphragm or cervical cap. ? Get a prescription for certain kinds of emergency contraception. Follow-up care is a key part of your treatment and safety. Be sure to make and go to all appointments, and call your doctor if you are having problems. It's also a good idea to know your test results and keep a list of the medicines you take. Where can you learn more? Go to http://www.gray.com/  Enter [id number] in the search box to learn more about \"Learning About Birth Control After Childbirth. \"  Current as of: October 8, 2020               Content Version: 12.8  © 2006-2021 [employer]. Care instructions adapted under license by [employer] (which disclaims liability or warranty for this information). If you have questions about a medical condition or this instruction, always ask your healthcare professional. [place] [address] any warranty or liability for your use of this information. Patient Education        Learning About Starting to Breastfeed  Planning ahead     Before your baby is born, plan ahead. Learn all you can about breastfeeding. This helps make breastfeeding easier. · Early in your pregnancy, talk to your doctor or midwife about breastfeeding. · Learn the basics before your baby is born. The staff at hospitals and birthing centers can help you find a lactation specialist. This person is often a nurse who has been trained to teach and advise women about breastfeeding. Or you can take a breastfeeding class. · Plan ahead for times when you will need help after your baby is born. Many women get help from friends and family.  Some join a support group to talk to other moms who breastfeed. · Buy the equipment you'll need. Examples are breast pads, nipple cream, extra pillows, and nursing bras. Find out about breast pumps too. Getting help from your hospital or birthing center  It's important to have support from the doctors, nurses, and hospital staff who care for you and your baby. Before it's time for you to give birth, ask about the breastfeeding policies at your hospital or birthing center. Look for a hospital or birthing center that has policies for:  · \"Rooming in. \" This policy encourages you to have your baby in the room with you. It can allow you to breastfeed more often. · Supplemental feedings. Tell the staff that your baby is to get only your breast milk from birth. If staff feed your baby water, sugar solution, or formula right after birth without a medical reason, it may make it harder for you to breastfeed. · Pacifiers or artificial nipples. Staff should not give your  these items. They may interfere with breastfeeding. · Follow-up. Find out if your hospital can help you with breastfeeding issues after you go home. See if you can get information on support groups or other contacts. They might help if you need help setting up and staying with your breastfeeding routine. Your first feeding  It's best to start breastfeeding within 1 hour of birth. For each feeding, you go through these basic steps:  · Get ready for the feeding. Be calm and relaxed, and try not to be distracted. Get some water or juice for yourself. Use two or three pillows to help support your baby while he or she is nursing. · Find a breastfeeding position that is comfortable for you and your baby. Examples are the cradle and the football positions. Make sure the baby's head and chest are lined up straight and facing your breast. It's best to switch which breast you start with each time. · Get the baby latched on well.  Your baby's mouth needs to be wide open, like a yawn, so you may need to gently touch the middle of your baby's lower lip. When your baby's mouth is open wide, quickly bring the baby onto your nipple and areola. The areola is the dark [place] around your nipple. · Provide a complete feeding. Let your baby decide how long to nurse. Be sure to burp your baby after each breast.  In the first days after birth, your breasts make a thick, yellow liquid called colostrum. This liquid gives your baby nutrients and antibodies against infection. It is all that babies need at first. Your breasts will fill with milk a few days after the birth. Talk to your doctor, midwife, or lactation specialist right away if you are having problems and aren't sure what to do. How often to breastfeed  Plan to breastfeed your baby on demand rather than setting a strict schedule. For the first 2 weeks, be prepared to breastfeed at least 8 times in a 24-hour period. In the first few days, you may need to wake a sleeping baby to feed. If you breastfeed more often, it will help your breasts to produce more milk. After you go home  After you're home, don't be afraid to call your doctor, midwife, or lactation specialist with questions. That's true even if you don't know what's bothering you. They are used to parents of newborns calling. They can help you figure out if there is a problem, and if so, how to fix it. Plan for times when you will be apart from your baby. Use a breast pump to collect breast milk ahead of time. You can store milk in the refrigerator or freezer. Then it's ready when someone else will be taking care of your baby. Experts recommend waiting about a month until breastfeeding is going well before offering a bottle. Breastfeeding is a learned skill that gets easier over time. You are more likely to succeed if you plan ahead, learn the basic techniques, and know where to get help and support. Where can you learn more?   Go to http://www.gray.com/  Enter E551 in the search box to learn more about \"Learning About Starting to Breastfeed. \"  Current as of: 2020               Content Version: 12.8   [employer]. Care instructions adapted under license by [employer] (which disclaims liability or warranty for this information). If you have questions about a medical condition or this instruction, always ask your healthcare professional. [place] [address] any warranty or liability for your use of this information. Patient Education        Postpartum: Care Instructions  Your Care Instructions  After childbirth (postpartum period), your body goes through many changes. Some of these changes happen over several weeks. In the hours after delivery, your body will begin to recover from childbirth while it prepares to breastfeed your . You may feel emotional during this time. Your hormones can shift your mood without warning for no clear reason. In the first couple of weeks after childbirth, many women have emotions that change from happy to sad. You may find it hard to sleep. You may cry a lot. This is called the \"baby blues. \" These overwhelming emotions often go away within a couple of days or weeks. But it's important to discuss your feelings with your doctor. It is easy to get too tired and overwhelmed during the first weeks after childbirth. Don't try to do too much. Get rest whenever you can, accept help from others, and eat well and drink plenty of fluids. In the first couple of weeks after giving birth, your doctor or midwife may want to check in with you and make a plan for any follow-up care you may need. You will likely have a complete postpartum visit in the first 3 months after delivery. At that time, your doctor or midwife will check on your recovery from childbirth.  He or she will also see how you are doing with your emotions and talk about your concerns or questions. Follow-up care is a key part of your treatment and safety. Be sure to make and go to all appointments, and call your doctor if you are having problems. It's also a good idea to know your test results and keep a list of the medicines you take. How can you care for yourself at home? · Sleep or rest when your baby sleeps. · Get help with household chores from family or friends, if you can. Do not try to do it all yourself. · If you have hemorrhoids or swelling or pain around the opening of your vagina, try using cold and heat. You can put ice or a cold pack on the area for 10 to 20 minutes at a time. Put a thin cloth between the ice and your skin. Also try sitting in a few inches of warm water (sitz bath) 3 times a day and after bowel movements. · Take pain medicines exactly as directed. ? If the doctor gave you a prescription medicine for pain, take it as prescribed. ? If you are not taking a prescription pain medicine, ask your doctor if you can take an over-the-counter medicine. · Eat more fiber to avoid constipation. Include foods such as whole-grain breads and cereals, raw vegetables, raw and dried fruits, and beans. · Drink plenty of fluids. If you have kidney, heart, or liver disease and have to limit fluids, talk with your doctor before you increase the amount of fluids you drink. · Do not rinse inside your vagina with fluids (douche). · If you have stitches, keep the area clean by pouring or spraying warm water over the area outside your vagina and anus after you use the toilet. · Keep a list of questions to ask your doctor or midwife. Your questions might be about:  ? Changes in your breasts, such as lumps or soreness. ? When to expect your menstrual period to start again. ? What form of birth control is best for you. ? Weight you have put on during the pregnancy. ? Exercise options.   ? What foods and drinks are best for you, especially if you are breastfeeding. ? Problems you might be having with breastfeeding. ? When you can have sex. Some women may want to talk about lubricants for the vagina. ? Any feelings of sadness or restlessness that you are having. When should you call for help? Call 911 anytime you think you may need emergency care. For example, call if:    · You have thoughts of harming yourself, your baby, or another person.     · You passed out (lost consciousness).     · You have chest pain, are short of breath, or cough up blood.     · You have a seizure. Call your doctor now or seek immediate medical care if:    · Your vaginal bleeding seems to be getting heavier.     · You are dizzy or lightheaded, or you feel like you may faint.     · You have a fever.     · You have new or more belly pain.     · You have symptoms of a blood clot in your leg (called a deep vein thrombosis), such as:  ? Pain in the calf, back of the knee, thigh, or groin. ? Redness and swelling in your leg or groin.     · You have signs of preeclampsia, such as:  ? Sudden swelling of your face, hands, or feet. ? New vision problems (such as dimness, blurring, or seeing spots). ? A severe headache. Watch closely for changes in your health, and be sure to contact your doctor if:    · You have new or worse vaginal discharge.     · You feel sad or depressed.     · You are having problems with your breasts or breastfeeding. Where can you learn more? Go to http://www.gray.com/  Enter F656 in the search box to learn more about \"Postpartum: Care Instructions. \"  Current as of: October 8, 2020               Content Version: 12.8  © 6005-7004 [employer]. Care instructions adapted under license by [employer] (which disclaims liability or warranty for this information).  If you have questions about a medical condition or this instruction, always ask your healthcare professional. [place] any warranty or liability for your use of this information. We know that all of us are dealing with a tremendous amount of uncertainty, confusion and disruption to our daily lives, which may result in increased anxiety, depression and fear. If you are feeling unsettled or worse, please know that we are here to help. During this time of increased caution and care for one another, [facility] [place] ([address] [place] [address]) is offering virtual support groups to ALL MOTHERS in [place] regardless of the age of your child/children as a way to help weather this emotional storm together. Social support is an important part of self-care during this time of physical distancing. Virtual postpartum support group meetings available at www. postpartumva.org  Warm Line: 797.791.9843    Breastfeeding Support Groups (virtual)  1st and 3rd Wednesday of each month  2nd and 4th Tuesday of each month    [place] at [email or web address] under the \"About Us\" and \"Classes and Events tabs\"

## 2021-08-15 NOTE — PROGRESS NOTES
Post-Partum Day Number 2 Progress Note    Christie Jorge     Assessment: Doing well, post partum day 2    Plan:   - Discharge home today  - Follow up in office in 6 week(s) with [facility].  - Pain medication prescription(s) sent. - Questions answered. Information for the patient's :  Sarah Smith [[id number]]   Vaginal, Spontaneous    Patient doing well without significant complaint. Voiding without difficulty, normal lochia. Ready for discharge home. Vitals:  Visit Vitals  /84 (BP 1 Location: Left upper arm, BP Patient Position: At rest)   Pulse 92   Temp 97.7 °F (36.5 °C)   Resp 14   Ht 5' 5\" (1.651 m)   Wt 127 kg (280 lb)   SpO2 98%   Breastfeeding Unknown   BMI 46.59 kg/m²     Temp (24hrs), Av °F (36.7 °C), Min:97.7 °F (36.5 °C), Max:98.2 °F (36.8 °C)      Exam:        Patient without distress. Fundus firm, nontender per nursing fundal checks                Perineum with normal lochia noted per nursing assessment                Lower extremities are negative for pathological edema    Labs:     Lab Results   Component Value Date/Time    WBC 9.4 2021 05:07 PM    WBC 7.1 2020 03:49 PM    WBC 7.1 2018 10:12 AM    HGB 11.0 (L) 2021 05:07 PM    HGB 12.6 2020 03:49 PM    HGB 12.8 2018 10:12 AM    HCT 33.7 (L) 2021 05:07 PM    HCT 38.3 2020 03:49 PM    HCT 38.6 2018 10:12 AM    PLATELET 471  05:07 PM    PLATELET 306  03:49 PM    PLATELET 483  10:12 AM       No results found for this or any previous visit (from the past 24 hour(s)).

## 2021-08-15 NOTE — ROUTINE PROCESS
Bedside shift change report given to Christopher Guzman RN (oncoming nurse) by Gaye Funes RN (offgoing nurse). Report included the following information SBAR.

## 2021-08-17 ENCOUNTER — PATIENT OUTREACH (OUTPATIENT)
Dept: OTHER | Age: 31
End: 2021-08-17

## 2021-08-17 NOTE — PATIENT INSTRUCTIONS
After Your Delivery (the Postpartum Period): Care Instructions  Your Care Instructions     Congratulations on the birth of your baby. Like pregnancy, the  period can be a time of excitement, laila, and exhaustion. You may look at your wondrous little baby and feel happy. You may also be overwhelmed by your new sleep hours and new responsibilities. At first, babies often sleep during the days and are awake at night. They do not have a pattern or routine. They may make sudden gasps, jerk themselves awake, or look like they have crossed eyes. These are all normal, and they may even make you smile. In these first weeks after delivery, try to take good care of yourself. It may take 4 to 6 weeks to feel like yourself again, and possibly longer if you had a  birth. You will likely feel very tired for several weeks. Your days will be full of ups and downs, but lots of laila as well. Follow-up care is a key part of your treatment and safety. Be sure to make and go to all appointments, and call your doctor if you are having problems. It's also a good idea to know your test results and keep a list of the medicines you take. How can you care for yourself at home? Take care of your body after delivery  · Use pads instead of tampons for the bloody flow that may last as long as 2 weeks. · Ease cramps with ibuprofen (Advil, Motrin). · Ease soreness of hemorrhoids and the area between your vagina and rectum with ice compresses or witch hazel pads. · Ease constipation by drinking lots of fluid and eating high-fiber foods. Ask your doctor about over-the-counter stool softeners. · Cleanse yourself with a gentle squeeze of warm water from a bottle instead of wiping with toilet paper. · Take a sitz bath in warm water several times a day. · Wear a good nursing bra. Ease sore and swollen breasts with warm, wet washcloths. · If you are not breastfeeding, use ice rather than heat for breast soreness.   · Your period may not start for several months if you are breastfeeding. You may bleed more, and longer at first, than you did before you got pregnant. · Wait until you are healed (about 4 to 6 weeks) before you have sexual intercourse. Your doctor will tell you when it is okay to have sex. · Try not to travel with your baby for 5 or 6 weeks. If you take a long car trip, make frequent stops to walk around and stretch. Avoid exhaustion  · Rest every day. Try to nap when your baby naps. · Ask another adult to be with you for a few days after delivery. · Plan for  if you have other children. · Stay flexible so you can eat at odd hours and sleep when you need to. Both you and your baby are making new schedules. · Plan small trips to get out of the house. Change can make you feel less tired. · Ask for help with housework, cooking, and shopping. Remind yourself that your job is to care for your baby. Know about help for postpartum depression  · \"Baby blues\" are common for the first 1 to 2 weeks after birth. You may cry or feel sad or irritable for no reason. · Rest whenever you can. Being tired makes it harder to handle your emotions. · Go for walks with your baby. · Talk to your partner, friends, and family about your feelings. · If your symptoms last for more than a few weeks, or if you feel very depressed, ask your doctor for help. · Postpartum depression can be treated. Support groups and counseling can help. Sometimes medicine can also help. Stay healthy  · Eat healthy foods so you have more energy and lose extra baby pounds. · If you breastfeed, avoid drugs. If you quit smoking during pregnancy, try to stay smoke-free. If you choose to have a drink now and then, have only one drink, and limit the number of occasions that you have a drink. Wait to breastfeed at least 2 hours after you have a drink to reduce the amount of alcohol the baby may get in the milk.   · Start daily exercise after 4 to 6 weeks, but rest when you feel tired. · Learn exercises to tone your belly. Do Kegel exercises to regain strength in your pelvic muscles. You can do these exercises while you stand or sit. ? Squeeze the same muscles you would use to stop your urine. Your belly and thighs should not move. ? Hold the squeeze for 3 seconds, and then relax for 3 seconds. ? Start with 3 seconds. Then add 1 second each week until you are able to squeeze for 10 seconds. ? Repeat the exercise 10 to 15 times for each session. Do three or more sessions each day. · Find a class for new mothers and new babies that has an exercise time. · If you had a  birth, give yourself a bit more time before you exercise, and be careful. When should you call for help? Call  911 anytime you think you may need emergency care. For example, call if:    · You have thoughts of harming yourself, your baby, or another person.     · You passed out (lost consciousness).     · You have chest pain, are short of breath, or cough up blood.     · You have a seizure. Call your doctor now or seek immediate medical care if:    · You have severe vaginal bleeding. This means you are passing blood clots and soaking through a pad each hour for 2 or more hours.     · You are dizzy or lightheaded, or you feel like you may faint.     · You have a fever.     · You have new or more belly pain.     · You have signs of a blood clot in your leg (called a deep vein thrombosis), such as:  ? Pain in the calf, back of the knee, thigh, or groin. ? Redness and swelling in your leg or groin.     · You have signs of preeclampsia, such as:  ? Sudden swelling of your face, hands, or feet. ? New vision problems (such as dimness, blurring, or seeing spots). ? A severe headache.    Watch closely for changes in your health, and be sure to contact your doctor if:    · Your vaginal bleeding seems to be getting heavier.     · You have new or worse vaginal discharge.     · You feel sad, anxious, or hopeless for more than a few days.     · You do not get better as expected. Where can you learn more? Go to http://www.Atterocor.com/  Enter A461 in the search box to learn more about \"After Your Delivery (the Postpartum Period): Care Instructions. \"  Current as of: 2020               Content Version: 12.8   [employer]. Care instructions adapted under license by [employer] (which disclaims liability or warranty for this information). If you have questions about a medical condition or this instruction, always ask your healthcare professional. [place] [address] any warranty or liability for your use of this information. Postpartum: Care Instructions  Your Care Instructions  After childbirth (postpartum period), your body goes through many changes. Some of these changes happen over several weeks. In the hours after delivery, your body will begin to recover from childbirth while it prepares to breastfeed your . You may feel emotional during this time. Your hormones can shift your mood without warning for no clear reason. In the first couple of weeks after childbirth, many women have emotions that change from happy to sad. You may find it hard to sleep. You may cry a lot. This is called the \"baby blues. \" These overwhelming emotions often go away within a couple of days or weeks. But it's important to discuss your feelings with your doctor. It is easy to get too tired and overwhelmed during the first weeks after childbirth. Don't try to do too much. Get rest whenever you can, accept help from others, and eat well and drink plenty of fluids. In the first couple of weeks after giving birth, your doctor or midwife may want to check in with you and make a plan for any follow-up care you may need. You will likely have a complete postpartum visit in the first 3 months after delivery.  At that time, your doctor or midwife will check on your recovery from childbirth. He or she will also see how you are doing with your emotions and talk about your concerns or questions. Follow-up care is a key part of your treatment and safety. Be sure to make and go to all appointments, and call your doctor if you are having problems. It's also a good idea to know your test results and keep a list of the medicines you take. How can you care for yourself at home? · Sleep or rest when your baby sleeps. · Get help with household chores from family or friends, if you can. Do not try to do it all yourself. · If you have hemorrhoids or swelling or pain around the opening of your vagina, try using cold and heat. You can put ice or a cold pack on the area for 10 to 20 minutes at a time. Put a thin cloth between the ice and your skin. Also try sitting in a few inches of warm water (sitz bath) 3 times a day and after bowel movements. · Take pain medicines exactly as directed. ? If the doctor gave you a prescription medicine for pain, take it as prescribed. ? If you are not taking a prescription pain medicine, ask your doctor if you can take an over-the-counter medicine. · Eat more fiber to avoid constipation. Include foods such as whole-grain breads and cereals, raw vegetables, raw and dried fruits, and beans. · Drink plenty of fluids. If you have kidney, heart, or liver disease and have to limit fluids, talk with your doctor before you increase the amount of fluids you drink. · Do not rinse inside your vagina with fluids (douche). · If you have stitches, keep the area clean by pouring or spraying warm water over the area outside your vagina and anus after you use the toilet. · Keep a list of questions to ask your doctor or midwife. Your questions might be about:  ? Changes in your breasts, such as lumps or soreness. ? When to expect your menstrual period to start again. ? What form of birth control is best for you. ?  Weight you have put on during the pregnancy. ? Exercise options. ? What foods and drinks are best for you, especially if you are breastfeeding. ? Problems you might be having with breastfeeding. ? When you can have sex. Some women may want to talk about lubricants for the vagina. ? Any feelings of sadness or restlessness that you are having. When should you call for help? Call 911 anytime you think you may need emergency care. For example, call if:    · You have thoughts of harming yourself, your baby, or another person.     · You passed out (lost consciousness).     · You have chest pain, are short of breath, or cough up blood.     · You have a seizure. Call your doctor now or seek immediate medical care if:    · Your vaginal bleeding seems to be getting heavier.     · You are dizzy or lightheaded, or you feel like you may faint.     · You have a fever.     · You have new or more belly pain.     · You have symptoms of a blood clot in your leg (called a deep vein thrombosis), such as:  ? Pain in the calf, back of the knee, thigh, or groin. ? Redness and swelling in your leg or groin.     · You have signs of preeclampsia, such as:  ? Sudden swelling of your face, hands, or feet. ? New vision problems (such as dimness, blurring, or seeing spots). ? A severe headache. Watch closely for changes in your health, and be sure to contact your doctor if:    · You have new or worse vaginal discharge.     · You feel sad or depressed.     · You are having problems with your breasts or breastfeeding. Where can you learn more? Go to http://www.gray.com/  Enter D505 in the search box to learn more about \"Postpartum: Care Instructions. \"  Current as of: October 8, 2020               Content Version: 12.8  © 8441-6351 [employer]. Care instructions adapted under license by [employer] (which disclaims liability or warranty for this information).  If you have questions about a medical condition or this instruction, always ask your healthcare professional. [address] any warranty or liability for your use of this information. Your [place] at Home: Care Instructions  Your Care Instructions     During your baby's first few weeks, you will spend most of your time feeding, diapering, and comforting your baby. You may feel overwhelmed at times. It is normal to wonder if you know what you are doing, especially if you are first-time parents. [place] care gets easier with every day. Soon you will know what each cry means and be able to figure out what your baby needs and wants. Follow-up care is a key part of your child's treatment and safety. Be sure to make and go to all appointments, and call your doctor if your child is having problems. It's also a good idea to know your child's test results and keep a list of the medicines your child takes. How can you care for your child at home? Feeding  · Feed your baby on demand. This means that you should breastfeed or bottle-feed your baby whenever he or she seems hungry. Do not set a schedule. · During the first 2 weeks, your baby will breastfeed at least 8 times in a 24-hour period. Formula-fed babies may need fewer feedings, at least 6 every 24 hours. · These early feedings often are short. Sometimes, a  nurses or drinks from a bottle only for a few minutes. Feedings gradually will last longer. · You may have to wake your sleepy baby to feed in the first few days after birth. Sleeping  · Always put your baby to sleep on his or her back, not the stomach. This lowers the risk of sudden infant death syndrome (SIDS). · Most babies sleep for a total of 18 hours each day. They wake for a short time at least every 2 to 3 hours. · Newborns have some moments of active sleep. The baby may make sounds or seem restless. This happens about every 50 to 60 minutes and usually lasts a few minutes.   · At first, your baby may sleep through loud noises. Later, noises may wake your baby. · When your  wakes up, he or she usually will be hungry and will need to be fed. Diaper changing and bowel habits  · Try to check your baby's diaper at least every 2 hours. If it needs to be changed, do it as soon as you can. That will help prevent diaper rash. · Your 's wet and soiled diapers can give you clues about your baby's health. Babies can become dehydrated if they're not getting enough breast milk or formula or if they lose fluid because of diarrhea, vomiting, or a fever. · For the first few days, your baby may have about 3 wet diapers a day. After that, expect 6 or more wet diapers a day throughout the first month of life. It can be hard to tell when a diaper is wet if you use disposable diapers. If you cannot tell, put a piece of tissue in the diaper. It will be wet when your baby urinates. · Keep track of what bowel habits are normal or usual for your child. Umbilical cord care  · Keep your baby's diaper folded below the stump. If that doesn't work well, before you put the diaper on your baby, cut out a small area near the top of the diaper to keep the cord open to air. · To keep the cord dry, give your baby a sponge bath instead of bathing your baby in a tub or sink. The stump should fall off within a week or two. When should you call for help? Call your baby's doctor now or seek immediate medical care if:    · Your baby has a rectal temperature that is less than 97.5°F (36.4°C) or is 100.4°F (38°C) or higher. Call if you cannot take your baby's temperature but he or she seems hot.     · Your baby has no wet diapers for 6 hours.     · Your baby's skin or whites of the eyes gets a brighter or deeper yellow.     · You see pus or red skin on or around the umbilical cord stump. These are signs of infection.    Watch closely for changes in your child's health, and be sure to contact your doctor if:    · Your baby is not having regular bowel movements based on his or her age.     · Your baby cries in an unusual way or for an unusual length of time.     · Your baby is rarely awake and does not wake up for feedings, is very fussy, seems too tired to eat, or is not interested in eating. Where can you learn more? Go to http://www.gray.com/  Enter S869 in the search box to learn more about \"Your [place] at Home: Care Instructions. \"  Current as of: May 27, 2020               Content Version: 12.8  © 1845-5538 [employer]. Care instructions adapted under license by [employer] (which disclaims liability or warranty for this information). If you have questions about a medical condition or this instruction, always ask your healthcare professional. [address] any warranty or liability for your use of this information.

## 2021-08-17 NOTE — PROGRESS NOTES
Care Transitions subsequent Follow Up Call    Call within 2 business days of discharge: Yes     Last Discharge [address]       Complaint Diagnosis Description Type Department Provider    21 Scheduled Induction  Admission (Discharged) Anne-Marie Fenton MD          Was this an external facility discharge? No Discharge Facility: [facility] [address] [place] [address] contacted the patient by telephone for follow-up call. Verified name and  with patient as identifiers. Risk Factors Identified: multi sab    Needs to be reviewed by the provider   none         Method of communication with provider : none      Advance Care Planning:   Does patient have an Advance Directive: not on file     Does patient have OB/Gyn Selected? Yes    Discussed follow up appointments. If no appointment was previously scheduled, appointment scheduling offered: Yes  [address] follow up appointment(s):   Future Appointments   Date Time Provider Emerita Rain   2021 10:45 AM Meghan Davis MD [facility] BS AMB     Non-[facility] follow up appointment(s): pt awaiting an appointment    OB History:   OB History    Para Term  AB Living   4 1 1   3 1   SAB TAB Ectopic Molar Multiple Live Births   3       0 1      # Outcome Date GA Lbr Blu/2nd Weight Sex Delivery Anes PTL Lv   4 Term 21 39w3d 16:30 / 01:35 8 lb 6.2 oz (3.805 kg) M Vag-Spont EPI N ANGELINE   3 SAB            2 SAB            1 SAB                39w3d    Medication reconciliation was performed with patient, who verbalizes understanding of administration of home medications. Advised obtaining a 90-day supply of all daily and as-needed medications. Barriers/Support system:  spouse  Return to work planning? Yes    Postpartum Assessment:  Vaginal, Lochia-light, Fever No, BM?  Yes , Decreased urinary output No, Perineal care-yes, Visual changes No, Calf Pain No, Headache No, Swelling No, Breast pain No, Depression or feelings of sadness or overwhelm-pt managing well so far, provided encouragement and support for taking time for self and allowing family available to help, also provided info for who to reach out to and when if additional support is needed., Breast feeding Yes, Feeding [id number] pt having some nipple soreness encouraged to use lanolin after each breast feeding session, Sleep safety and Infant's weight  Patient stated delivery experience: good just long since she was induced  Risk factors for ED visit or readmission: insulin resistance type b, anemia, anxiety    Patient given an opportunity to ask questions and does not have any further questions or concerns at this time. Were discharge instructions available to patient? Yes. Reviewed appropriate site of care based on symptoms and resources available to patient including: Benefits related nurse triage line, When to call 911 and Condition related references. The patient agrees to contact the OB/Gyn office for questions related to their healthcare. Goals      Attends follow-up appointments as directed       Educate and encourage importance of FU for prevention of complications or disease;   Assess the patient's relationship with a PCP and next FU visit scheduled;   Discuss importance of adherence to treatment plan and follow up visits;   Identify any barriers in transportation or access to FU appointments.  Assist patient with making FU appointments as needed;    It's also a good idea to know your test results.  Keep a list of the medicines you take.  Understands red flags post discharge. Call  911 anytime you think you may need emergency care. For example, call if:    · You have thoughts of harming yourself, your baby, or another person. · You passed out (lost consciousness). · You have chest pain, are short of breath, or cough up blood. · You have a seizure.   Call your doctor now or seek immediate medical care if:    · You have pain that does not get better after you take pain medicine. · You have severe vaginal bleeding. · You are dizzy or lightheaded, or you feel like you may faint. · You have new or worse pain in your belly or pelvis. · You have loose stitches, or your incision comes open. · You have symptoms of infection, such as:  ? Increased pain, swelling, warmth, or redness. ? A fever. · You have symptoms of a blood clot in your leg (called a deep vein thrombosis), such as:  ? Pain in your calf, back of the knee, thigh, or groin. ? Redness and swelling in your leg or groin. · You have signs of preeclampsia, such as:  ? Sudden swelling of your face, hands, or feet. ? New vision problems (such as dimness, blurring, or seeing spots). ? A severe headache. Watch closely for changes in your health, and be sure to contact your doctor if:    · You do not get better as expected. Plan for follow-up call in 10-14 days based on severity of symptoms and risk factors.   Plan for next call: follow up appointment-pp care/ care

## 2021-09-27 ENCOUNTER — PATIENT OUTREACH (OUTPATIENT)
Dept: OTHER | Age: 31
End: 2021-09-27

## 2021-09-27 NOTE — PROGRESS NOTES
HISTORY OF PRESENT ILLNESS  Radha Tirado is a 32 y.o. female. HPI     Last here 8/04/20. Pt is here for routine care. Pt gave birth to a baby boy Santi Berg on 8/13/21  She had gestational diabetes, this has resolved was primarily diet-controlled     BP today is 126/82     Wt today is 261 lbs down from her pregnancy weight  She is on metformin 1000 BID  She denies diarrhea   She has been seeing dietician at [facility]     Reviewed labs   Ordered labs     Pt follows with Dr Yudelka Abbasi (GI)  Last visit in 8/18  Has famotidine as needed     She is taking zoloft 100 mg for anxiety--increase since last office visit doing well b  This has been working well        prev, Pt c/o menstrual HA x 8 years     Pt follows with Dr Viviana Vail (gyn)   Last visit 3/24/20  She has had 2 miscarriage, first one 8/19 second 3/20  She is seeing Dr. Sandra Murphy (gyn) just had a baby see above  She had suction dilation and curettage 8/15/19 and 3/26/20  Last visit 6/17/20     Pt is currently taking prenatal vitamins and vitamin D     PREVENTIVE:  Colonoscopy: not yet needed  Pap: [facility], 9/23/21  Mammogram: not yet needed  Dexa: not yet needed  Tdap: 5/21  Pneumovax: not yet needed  Shingrix: not yet needed  Flu shot: Fall 2019, will get today 09/28/21  Eye exam: 3/18, due reminded  Lipids: 7/21  be well  A1c: 7/18 5.2 7/21 5.0  Covid: both [employer])    Patient Active Problem List    Diagnosis Date Noted    Encounter for induction of labor 08/11/2021    Gastroesophageal reflux disease without esophagitis 10/30/2018    Anxiety 10/30/2018    Obesity, morbid ([facility] [address] 07/30/2018     Current Outpatient Medications   Medication Sig Dispense Refill    sertraline (ZOLOFT) 100 mg tablet Take 1 Tablet by mouth daily. 90 Tablet 1    cholecalciferol, vitamin D3, (VITAMIN D3 PO) Take 1 Tablet by mouth.  [id number] raven-folic-dha (PRENATAL DHA+COMPLETE PRENATAL) -300 mg-mcg-mg cmpk Take 1 Tab by mouth.       famotidine (Pepcid) 20 mg tablet Take 20 mg by mouth two (2) times a day. (Patient not taking: Reported on 5/19/2021)      cetirizine (ZYRTEC) 10 mg tablet Take 10 mg by mouth daily. (Patient not taking: Reported on 9/28/2021)       Past Surgical History:   Procedure Laterality Date    HX DILATION AND CURETTAGE  08/2019    HX OTHER SURGICAL      D & C     HX OTHER SURGICAL      D & C     HX OTHER SURGICAL      tonsillectomy and wisdom teeth     HX TONSILLECTOMY      HX WISDOM TEETH EXTRACTION      UPPER GI ENDOSCOPY,BIOPSY  5/8/2018           Lab Results   Component Value Date/Time    WBC 9.4 08/11/2021 05:07 PM    HGB 11.0 (L) 08/11/2021 05:07 PM    HCT 33.7 (L) 08/11/2021 05:07 PM    PLATELET 511 97/67/8423 05:07 PM    MCV 86.2 08/11/2021 05:07 PM     Lab Results   Component Value Date/Time    Cholesterol, total 267 (H) 07/15/2021 07:32 AM    HDL Cholesterol 75 07/15/2021 07:32 AM    LDL, calculated 151.4 (H) 07/15/2021 07:32 AM    Triglyceride 203 (H) 07/15/2021 07:32 AM     Lab Results   Component Value Date/Time    GFR est non- 08/11/2020 03:49 PM    GFR est  08/11/2020 03:49 PM    Creatinine 0.77 08/11/2020 03:49 PM    BUN 13 08/11/2020 03:49 PM    Sodium 139 08/11/2020 03:49 PM    Potassium 4.2 08/11/2020 03:49 PM    Chloride 102 08/11/2020 03:49 PM    CO2 22 08/11/2020 03:49 PM        Review of Systems   Respiratory: Negative for shortness of breath. Cardiovascular: Negative for chest pain. Physical Exam  Constitutional:       General: She is not in acute distress. Appearance: Normal appearance. She is not ill-appearing, toxic-appearing or diaphoretic. HENT:      Head: Normocephalic and atraumatic. Right Ear: External ear normal.      Left Ear: External ear normal.   Eyes:      General:         Right eye: No discharge. Left eye: No discharge. Conjunctiva/sclera: Conjunctivae normal.      Pupils: Pupils are equal, round, and reactive to light.    Cardiovascular:      Rate and Rhythm: Normal rate and regular rhythm. Heart sounds: No murmur heard. No friction rub. No gallop. Pulmonary:      Effort: No respiratory distress. Breath sounds: Normal breath sounds. No wheezing or rales. Chest:      Chest wall: No tenderness. Musculoskeletal:         General: Normal range of motion. Cervical back: Normal range of motion and neck supple. Skin:     General: Skin is warm and dry. Neurological:      Mental Status: She is alert and oriented to person, place, and time. Mental status is at baseline. Coordination: Coordination normal.      Gait: Gait normal.   Psychiatric:         Mood and Affect: Mood normal.         Behavior: Behavior normal.         ASSESSMENT and PLAN    ICD-10-CM ICD-9-CM    1. Physical exam      V70.15 A26.3 METABOLIC PANEL, COMPREHENSIVE   [employer] shot up-to-date flu shot today    Discussed diet and weight loss    Pelvic exam up-to-date    Labs ordered    Eye exam is due   HEMOGLOBIN A1C WITH EAG      TSH 3RD GENERATION      CBC W/O DIFF      HEPATITIS C AB   2. Obesity, Class III, BMI 40-49.9 (morbid obesity) ([facility] [address] discussed need for weight loss E66.01 278.01    Anxiety controlled on Zoloft 100 mg daily continue    Depression screen reviewed and negative     Scribed by Racquel Sharma, as dictated by Dr. Jana Givens. Current diagnosis and concerns discussed with pt at length. Pt understands risks and benefits or current treatment plan and medications, and accepts the treatment and medication with any possible risks. Pt asks appropriate questions, which were answered. Pt was instructed to call with any concerns or problems. I have reviewed the note documented by the scribe. The services provided are my own.   The documentation is accurate

## 2021-09-28 ENCOUNTER — OFFICE VISIT (OUTPATIENT)
Dept: INTERNAL MEDICINE CLINIC | Age: 31
End: 2021-09-28
Payer: COMMERCIAL

## 2021-09-28 VITALS
HEART RATE: 94 BPM | RESPIRATION RATE: 16 BRPM | TEMPERATURE: 98.1 F | SYSTOLIC BLOOD PRESSURE: 126 MMHG | BODY MASS INDEX: 43.49 KG/M2 | WEIGHT: 261 LBS | HEIGHT: 65 IN | DIASTOLIC BLOOD PRESSURE: 82 MMHG | OXYGEN SATURATION: 98 %

## 2021-09-28 DIAGNOSIS — Z23 ENCOUNTER FOR IMMUNIZATION: ICD-10-CM

## 2021-09-28 DIAGNOSIS — E66.01 OBESITY, CLASS III, BMI 40-49.9 (MORBID OBESITY) (HCC): ICD-10-CM

## 2021-09-28 DIAGNOSIS — Z00.00 PHYSICAL EXAM: Primary | ICD-10-CM

## 2021-09-28 LAB
ALBUMIN SERPL-MCNC: 3.7 G/DL (ref 3.5–5)
ALBUMIN/GLOB SERPL: 1.2 {RATIO} (ref 1.1–2.2)
ALP SERPL-CCNC: 68 U/L (ref 45–117)
ALT SERPL-CCNC: 70 U/L (ref 12–78)
ANION GAP SERPL CALC-SCNC: 4 MMOL/L (ref 5–15)
AST SERPL-CCNC: 30 U/L (ref 15–37)
BILIRUB SERPL-MCNC: 0.3 MG/DL (ref 0.2–1)
BUN SERPL-MCNC: 14 MG/DL (ref 6–20)
BUN/CREAT SERPL: 17 (ref 12–20)
CALCIUM SERPL-MCNC: 9.1 MG/DL (ref 8.5–10.1)
CHLORIDE SERPL-SCNC: 108 MMOL/L (ref 97–108)
CO2 SERPL-SCNC: 28 MMOL/L (ref 21–32)
CREAT SERPL-MCNC: 0.81 MG/DL (ref 0.55–1.02)
ERYTHROCYTE [DISTWIDTH] IN BLOOD BY AUTOMATED COUNT: 14.3 % (ref 11.5–14.5)
EST. AVERAGE GLUCOSE BLD GHB EST-MCNC: 108 MG/DL
GLOBULIN SER CALC-MCNC: 3.2 G/DL (ref 2–4)
GLUCOSE SERPL-MCNC: 103 MG/DL (ref 65–100)
HBA1C MFR BLD: 5.4 % (ref 4–5.6)
HCT VFR BLD AUTO: 40.3 % (ref 35–47)
HCV AB SERPL QL IA: NONREACTIVE
HGB BLD-MCNC: 12.4 G/DL (ref 11.5–16)
MCH RBC QN AUTO: 27.2 PG (ref 26–34)
MCHC RBC AUTO-ENTMCNC: 30.8 G/DL (ref 30–36.5)
MCV RBC AUTO: 88.4 FL (ref 80–99)
NRBC # BLD: 0 K/UL (ref 0–0.01)
NRBC BLD-RTO: 0 PER 100 WBC
PLATELET # BLD AUTO: 328 K/UL (ref 150–400)
PMV BLD AUTO: 10.5 FL (ref 8.9–12.9)
POTASSIUM SERPL-SCNC: 4.4 MMOL/L (ref 3.5–5.1)
PROT SERPL-MCNC: 6.9 G/DL (ref 6.4–8.2)
RBC # BLD AUTO: 4.56 M/UL (ref 3.8–5.2)
SODIUM SERPL-SCNC: 140 MMOL/L (ref 136–145)
TSH SERPL DL<=0.05 MIU/L-ACNC: 0.84 UIU/ML (ref 0.36–3.74)
WBC # BLD AUTO: 5.5 K/UL (ref 3.6–11)

## 2021-09-28 PROCEDURE — 99395 PREV VISIT EST AGE 18-39: CPT | Performed by: INTERNAL MEDICINE

## 2021-09-28 PROCEDURE — 90686 IIV4 VACC NO PRSV 0.5 ML IM: CPT | Performed by: INTERNAL MEDICINE

## 2021-09-28 PROCEDURE — 90471 IMMUNIZATION ADMIN: CPT | Performed by: INTERNAL MEDICINE

## 2021-09-28 RX ORDER — SERTRALINE HYDROCHLORIDE 100 MG/1
100 TABLET, FILM COATED ORAL DAILY
Qty: 90 TABLET | Refills: 1 | Status: SHIPPED | OUTPATIENT
Start: 2021-09-28 | End: 2022-03-21

## 2021-09-28 RX ORDER — SERTRALINE HYDROCHLORIDE 50 MG/1
100 TABLET, FILM COATED ORAL DAILY
Qty: 180 TABLET | Refills: 1 | Status: CANCELLED | OUTPATIENT
Start: 2021-09-28

## 2021-09-28 NOTE — PROGRESS NOTES
Attempted to reach this pt for final call. Her mailbox was full, I could not leave a vm. Pt has no additional hospital visit since delivery and has recently been to her pcp for an annual visit. Will resolve this episode if no contact back from pt.

## 2022-03-18 PROBLEM — K21.9 GASTROESOPHAGEAL REFLUX DISEASE WITHOUT ESOPHAGITIS: Status: ACTIVE | Noted: 2018-10-30

## 2022-03-18 PROBLEM — Z34.90 ENCOUNTER FOR INDUCTION OF LABOR: Status: ACTIVE | Noted: 2021-08-11

## 2022-03-18 PROBLEM — F41.9 ANXIETY: Status: ACTIVE | Noted: 2018-10-30

## 2022-03-19 PROBLEM — E66.01 OBESITY, MORBID (HCC): Status: ACTIVE | Noted: 2018-07-30

## 2022-03-21 RX ORDER — SERTRALINE HYDROCHLORIDE 100 MG/1
TABLET, FILM COATED ORAL
Qty: 90 TABLET | Refills: 1 | Status: SHIPPED | OUTPATIENT
Start: 2022-03-21

## 2022-06-16 LAB
CHOLEST SERPL-MCNC: 198 MG/DL
GLUCOSE SERPL-MCNC: 97 MG/DL (ref 65–100)
HDLC SERPL-MCNC: 48 MG/DL
LDLC SERPL CALC-MCNC: 122.2 MG/DL (ref 0–100)
TRIGL SERPL-MCNC: 139 MG/DL (ref ?–150)

## 2022-10-03 NOTE — PROGRESS NOTES
HISTORY OF PRESENT ILLNESS  Rolando Mckoy is a 28 y.o. female. HPI  Last here 9/28/21. Pt is here for routine care.     She is a physical therapist at [employer].      BP today is 106/73     Wt today is 257 lbs, down 4 lbs since lov  No longer on metformin 1000 BID   She has been seeing dietician at [address] and following w/ Dr. Willian White for wt/l   Has been taking Mounjaro 2.5 mg weekly for wt/l and topamax 25 mg  Discussed topomax is teratogenic, advised her to take additional precautions to prevent pregnancy     Reviewed labs   Ordered labs     Pt follows with Dr Rod Varghese (GI)  Last visit in 8/18  Has famotidine as needed     She is taking zoloft 150 mg (increased since lov by gyn) for anxiety  This has been working well     Previously pt c/o menstrual HA x 8 years     Pt follows with Dr Donzell Dancer (gyn)   Last visit 3/24/20  She has had 2 miscarriages, first one 8/19 second 3/20  Pt gave birth to a baby boy Radha First on 8/13/21   She had gestational diabetes, this has resolved was primarily diet-controlled  She had suction dilation and curettage 8/15/19 and 3/26/20  She is now seeing Dr. Willian White (gyn)   Last visit 9/6/22  Has been taking Mounjaro 2.5 mg weekly for wt/l   She is also following with a dietician    She has a mirena IUD     Pt is currently taking vitamin D     PREVENTIVE:  Colonoscopy: not yet needed  Pap: [facility], 9/23/21, scheduled 10/22  Mammogram: not yet needed  Dexa: not yet needed  Tdap: 5/21  Pneumovax: not yet needed  Shingrix: not yet needed  Flu shot: 09/28/21, she will get at work   Eye exam: 3/18, due reminded  Lipids: 7/21  be well  A1c: 7/18 5.2 7/21 5.0 9/21 5.4  Hep C: 9/21  Covid: both [employer]), discussed bivalent booster, she has never had covid    Patient Active Problem List    Diagnosis Date Noted    Encounter for induction of labor 08/11/2021    Gastroesophageal reflux disease without esophagitis 10/30/2018    Anxiety 10/30/2018    Obesity, morbid ([facility] [address] 07/30/2018 Current Outpatient Medications   Medication Sig Dispense Refill    tirzepatide (Mounjaro) 2.5 mg/0.5 mL pnij 2.5 mg.      cholecalciferol, vitamin D3, (VITAMIN D3 PO) Take 1 Tablet by mouth.      [id number] raven-folic-dha -831 mg-mcg-mg cmpk Take 1 Tab by mouth. topiramate (TOPAMAX) 25 mg tablet       sertraline (ZOLOFT) 100 mg tablet TAKE 1 TABLET BY MOUTH ONE TIME A DAY 90 Tablet 1    famotidine (Pepcid) 20 mg tablet Take 20 mg by mouth two (2) times a day. (Patient not taking: Reported on 5/19/2021)      cetirizine (ZYRTEC) 10 mg tablet Take 10 mg by mouth daily. (Patient not taking: Reported on 9/28/2021)       Past Surgical History:   Procedure Laterality Date    HX DILATION AND CURETTAGE  08/2019    HX OTHER SURGICAL      D & C     HX OTHER SURGICAL      D & C     HX OTHER SURGICAL      tonsillectomy and wisdom teeth     HX TONSILLECTOMY      HX WISDOM TEETH EXTRACTION      UPPER GI ENDOSCOPY,BIOPSY  5/8/2018           Lab Results   Component Value Date/Time    WBC 5.5 09/28/2021 11:44 AM    HGB 12.4 09/28/2021 11:44 AM    HCT 40.3 09/28/2021 11:44 AM    PLATELET 343 34/69/9218 11:44 AM    MCV 88.4 09/28/2021 11:44 AM     Lab Results   Component Value Date/Time    Cholesterol, total 198 06/16/2022 06:00 AM    HDL Cholesterol 48 06/16/2022 06:00 AM    LDL, calculated 122.2 (H) 06/16/2022 06:00 AM    Triglyceride 139 06/16/2022 06:00 AM     Lab Results   Component Value Date/Time    GFR est non-AA >60 09/28/2021 11:44 AM    GFR est AA >60 09/28/2021 11:44 AM    Creatinine 0.81 09/28/2021 11:44 AM    BUN 14 09/28/2021 11:44 AM    Sodium 140 09/28/2021 11:44 AM    Potassium 4.4 09/28/2021 11:44 AM    Chloride 108 09/28/2021 11:44 AM    CO2 28 09/28/2021 11:44 AM        Review of Systems   Respiratory:  Negative for shortness of breath. Cardiovascular:  Negative for chest pain. Physical Exam  Constitutional:       General: She is not in acute distress.      Appearance: She is not ill-appearing, toxic-appearing or diaphoretic. HENT:      Head: Normocephalic and atraumatic. Right Ear: External ear normal.      Left Ear: External ear normal.   Eyes:      General:         Right eye: No discharge. Left eye: No discharge. Conjunctiva/sclera: Conjunctivae normal.      Pupils: Pupils are equal, round, and reactive to light. Neck:      Vascular: No carotid bruit. Cardiovascular:      Rate and Rhythm: Normal rate and regular rhythm. Heart sounds: No murmur heard. No friction rub. No gallop. Pulmonary:      Effort: No respiratory distress. Breath sounds: Normal breath sounds. No wheezing or rales. Chest:      Chest wall: No tenderness. Musculoskeletal:         General: Normal range of motion. Cervical back: Normal.   Skin:     General: Skin is warm and dry. Neurological:      Mental Status: She is oriented to person, place, and time. Mental status is at baseline. Coordination: Coordination normal.      Gait: Gait normal.   Psychiatric:         Mood and Affect: Mood normal.         Behavior: Behavior normal.       ASSESSMENT and PLAN    ICD-10-CM ICD-9-CM    1. Physical exam, annual  Z00.00 V70.0 LIPID PANEL      METABOLIC PANEL, COMPREHENSIVE   Working with [facility] for weight loss on mounjaro and Topamax continue    Labs ordered    Will get flu shot at work    Plans in getting updated COVID vaccines well pelvic exam is scheduled Tdap is up-to-date   HEMOGLOBIN A1C WITH EAG      TSH 3RD GENERATION   Colonoscopy not yet needed   CBC W/O DIFF      2. Obesity, Class III, BMI 40-49.9 (morbid obesity) ([facility])  E66.01 278.01    See above   3. Anxiety  F41.9 300.00    Improved with Zoloft 150 mg daily     Depression screen reviewed and negative. Scribed by Neal Moctezuma, as dictated by Dr. Doris You. Current diagnosis and concerns discussed with pt at length.  Pt understands risks and benefits or current treatment plan and medications, and accepts the treatment and medication with any possible risks. Pt asks appropriate questions, which were answered. Pt was instructed to call with any concerns or problems. I have reviewed the note documented by the scribe. The services provided are my own. The documentation is accurate.

## 2022-10-04 ENCOUNTER — OFFICE VISIT (OUTPATIENT)
Dept: INTERNAL MEDICINE CLINIC | Age: 32
End: 2022-10-04

## 2022-10-04 VITALS
SYSTOLIC BLOOD PRESSURE: 106 MMHG | RESPIRATION RATE: 16 BRPM | TEMPERATURE: 97.2 F | HEART RATE: 101 BPM | BODY MASS INDEX: 42.88 KG/M2 | WEIGHT: 257.4 LBS | OXYGEN SATURATION: 98 % | HEIGHT: 65 IN | DIASTOLIC BLOOD PRESSURE: 73 MMHG

## 2022-10-04 DIAGNOSIS — E66.01 OBESITY, CLASS III, BMI 40-49.9 (MORBID OBESITY) (HCC): ICD-10-CM

## 2022-10-04 DIAGNOSIS — Z00.00 PHYSICAL EXAM, ANNUAL: Primary | ICD-10-CM

## 2022-10-04 DIAGNOSIS — F41.9 ANXIETY: ICD-10-CM

## 2022-10-04 PROBLEM — Z34.90 ENCOUNTER FOR INDUCTION OF LABOR: Status: RESOLVED | Noted: 2021-08-11 | Resolved: 2022-10-04

## 2022-10-04 PROCEDURE — 99395 PREV VISIT EST AGE 18-39: CPT | Performed by: INTERNAL MEDICINE

## 2022-10-04 RX ORDER — TOPIRAMATE 25 MG/1
TABLET ORAL
COMMUNITY
Start: 2022-09-13

## 2022-10-04 RX ORDER — TIRZEPATIDE 2.5 MG/.5ML
2.5 INJECTION, SOLUTION SUBCUTANEOUS
COMMUNITY

## 2022-10-04 NOTE — PROGRESS NOTES
1. \"Have you been to the ER, urgent care clinic since your last visit? Hospitalized since your last visit? \" No    2. \"Have you seen or consulted any other health care providers outside of the [address] [place] [address] since your last visit? \" No     3. For patients aged 39-70: Has the patient had a colonoscopy / FIT/ Cologuard? NA - based on age      If the patient is female:    4. For patients aged 41-77: Has the patient had a mammogram within the past 2 years? NA - based on age or sex      11. For patients aged 21-65: Has the patient had a pap smear?  Yes - no Care Gap present

## 2022-10-05 LAB
ALBUMIN SERPL-MCNC: 4.4 G/DL (ref 3.5–5)
ALBUMIN/GLOB SERPL: 1.6 {RATIO} (ref 1.1–2.2)
ALP SERPL-CCNC: 54 U/L (ref 45–117)
ALT SERPL-CCNC: 24 U/L (ref 12–78)
ANION GAP SERPL CALC-SCNC: 8 MMOL/L (ref 5–15)
AST SERPL-CCNC: 12 U/L (ref 15–37)
BILIRUB SERPL-MCNC: 0.3 MG/DL (ref 0.2–1)
BUN SERPL-MCNC: 15 MG/DL (ref 6–20)
BUN/CREAT SERPL: 16 (ref 12–20)
CALCIUM SERPL-MCNC: 8.9 MG/DL (ref 8.5–10.1)
CHLORIDE SERPL-SCNC: 110 MMOL/L (ref 97–108)
CHOLEST SERPL-MCNC: 186 MG/DL
CO2 SERPL-SCNC: 22 MMOL/L (ref 21–32)
CREAT SERPL-MCNC: 0.91 MG/DL (ref 0.55–1.02)
ERYTHROCYTE [DISTWIDTH] IN BLOOD BY AUTOMATED COUNT: 13.1 % (ref 11.5–14.5)
EST. AVERAGE GLUCOSE BLD GHB EST-MCNC: 108 MG/DL
GLOBULIN SER CALC-MCNC: 2.8 G/DL (ref 2–4)
GLUCOSE SERPL-MCNC: 126 MG/DL (ref 65–100)
HBA1C MFR BLD: 5.4 % (ref 4–5.6)
HCT VFR BLD AUTO: 41.8 % (ref 35–47)
HDLC SERPL-MCNC: 41 MG/DL
HDLC SERPL: 4.5 {RATIO} (ref 0–5)
HGB BLD-MCNC: 13.7 G/DL (ref 11.5–16)
LDLC SERPL CALC-MCNC: 101 MG/DL (ref 0–100)
MCH RBC QN AUTO: 29 PG (ref 26–34)
MCHC RBC AUTO-ENTMCNC: 32.8 G/DL (ref 30–36.5)
MCV RBC AUTO: 88.6 FL (ref 80–99)
NRBC # BLD: 0 K/UL (ref 0–0.01)
NRBC BLD-RTO: 0 PER 100 WBC
PLATELET # BLD AUTO: 306 K/UL (ref 150–400)
PMV BLD AUTO: 10.8 FL (ref 8.9–12.9)
POTASSIUM SERPL-SCNC: 3.9 MMOL/L (ref 3.5–5.1)
PROT SERPL-MCNC: 7.2 G/DL (ref 6.4–8.2)
RBC # BLD AUTO: 4.72 M/UL (ref 3.8–5.2)
SODIUM SERPL-SCNC: 140 MMOL/L (ref 136–145)
TRIGL SERPL-MCNC: 220 MG/DL (ref ?–150)
TSH SERPL DL<=0.05 MIU/L-ACNC: 0.8 UIU/ML (ref 0.36–3.74)
VLDLC SERPL CALC-MCNC: 44 MG/DL
WBC # BLD AUTO: 7.5 K/UL (ref 3.6–11)

## 2023-05-17 RX ORDER — FAMOTIDINE 20 MG/1
20 TABLET, FILM COATED ORAL 2 TIMES DAILY
COMMUNITY

## 2023-05-17 RX ORDER — SERTRALINE HYDROCHLORIDE 100 MG/1
1 TABLET, FILM COATED ORAL DAILY
COMMUNITY
Start: 2022-03-21

## 2023-05-17 RX ORDER — TIRZEPATIDE 2.5 MG/.5ML
2.5 INJECTION, SOLUTION SUBCUTANEOUS
COMMUNITY

## 2023-05-17 RX ORDER — CETIRIZINE HYDROCHLORIDE 10 MG/1
10 TABLET ORAL DAILY
COMMUNITY

## 2023-05-17 RX ORDER — TOPIRAMATE 25 MG/1
TABLET ORAL
COMMUNITY
Start: 2022-09-13

## 2023-10-02 ASSESSMENT — ENCOUNTER SYMPTOMS: SHORTNESS OF BREATH: 0

## 2023-10-02 NOTE — PROGRESS NOTES
[place] Grapes, as dictated by Dr. Jessica Gold. Current diagnosis and concerns discussed with pt at length. Pt understands risks and benefits or current treatment plan and medications, and accepts the treatment and medication with any possible risks. Pt asks appropriate questions, which were answered. Pt was instructed to call with any concerns or problems. I have reviewed the note documented by the scribe. The services provided are my own. The documentation is accurate.

## 2023-10-09 ENCOUNTER — OFFICE VISIT (OUTPATIENT)
Age: 33
End: 2023-10-09
Payer: COMMERCIAL

## 2023-10-09 VITALS
RESPIRATION RATE: 15 BRPM | HEART RATE: 85 BPM | HEIGHT: 65 IN | WEIGHT: 228.6 LBS | BODY MASS INDEX: 38.09 KG/M2 | SYSTOLIC BLOOD PRESSURE: 104 MMHG | OXYGEN SATURATION: 98 % | DIASTOLIC BLOOD PRESSURE: 74 MMHG | TEMPERATURE: 97.8 F

## 2023-10-09 DIAGNOSIS — F41.9 ANXIETY: ICD-10-CM

## 2023-10-09 DIAGNOSIS — K21.9 GASTROESOPHAGEAL REFLUX DISEASE WITHOUT ESOPHAGITIS: ICD-10-CM

## 2023-10-09 DIAGNOSIS — Z00.00 PHYSICAL EXAM: Primary | ICD-10-CM

## 2023-10-09 DIAGNOSIS — E66.01 OBESITY, MORBID (HCC): ICD-10-CM

## 2023-10-09 PROCEDURE — 99395 PREV VISIT EST AGE 18-39: CPT | Performed by: INTERNAL MEDICINE

## 2023-10-09 RX ORDER — PHENTERMINE HYDROCHLORIDE 15 MG/1
CAPSULE ORAL
COMMUNITY
Start: 2023-08-03

## 2023-10-09 RX ORDER — SERTRALINE HYDROCHLORIDE 100 MG/1
150 TABLET, FILM COATED ORAL DAILY
Qty: 270 TABLET | Refills: 1 | Status: SHIPPED | OUTPATIENT
Start: 2023-10-09

## 2023-10-09 RX ORDER — METFORMIN HYDROCHLORIDE 500 MG/1
TABLET, EXTENDED RELEASE ORAL
COMMUNITY
Start: 2023-08-02

## 2023-10-09 ASSESSMENT — PATIENT HEALTH QUESTIONNAIRE - PHQ9
SUM OF ALL RESPONSES TO PHQ QUESTIONS 1-9: 0
1. LITTLE INTEREST OR PLEASURE IN DOING THINGS: 0
SUM OF ALL RESPONSES TO PHQ QUESTIONS 1-9: 0
2. FEELING DOWN, DEPRESSED OR HOPELESS: 0
SUM OF ALL RESPONSES TO PHQ QUESTIONS 1-9: 0
SUM OF ALL RESPONSES TO PHQ QUESTIONS 1-9: 0
SUM OF ALL RESPONSES TO PHQ9 QUESTIONS 1 & 2: 0

## 2023-10-10 LAB
ALBUMIN SERPL-MCNC: 4.3 G/DL (ref 3.5–5)
ALBUMIN/GLOB SERPL: 1.5 (ref 1.1–2.2)
ALP SERPL-CCNC: 50 U/L (ref 45–117)
ALT SERPL-CCNC: 18 U/L (ref 12–78)
ANION GAP SERPL CALC-SCNC: 5 MMOL/L (ref 5–15)
AST SERPL-CCNC: 10 U/L (ref 15–37)
BILIRUB SERPL-MCNC: 0.3 MG/DL (ref 0.2–1)
BUN SERPL-MCNC: 20 MG/DL (ref 6–20)
BUN/CREAT SERPL: 26 (ref 12–20)
CALCIUM SERPL-MCNC: 9.1 MG/DL (ref 8.5–10.1)
CHLORIDE SERPL-SCNC: 107 MMOL/L (ref 97–108)
CHOLEST SERPL-MCNC: 214 MG/DL
CO2 SERPL-SCNC: 24 MMOL/L (ref 21–32)
CREAT SERPL-MCNC: 0.78 MG/DL (ref 0.55–1.02)
ERYTHROCYTE [DISTWIDTH] IN BLOOD BY AUTOMATED COUNT: 12.4 % (ref 11.5–14.5)
EST. AVERAGE GLUCOSE BLD GHB EST-MCNC: 108 MG/DL
GLOBULIN SER CALC-MCNC: 2.9 G/DL (ref 2–4)
GLUCOSE SERPL-MCNC: 92 MG/DL (ref 65–100)
HBA1C MFR BLD: 5.4 % (ref 4–5.6)
HCT VFR BLD AUTO: 40.1 % (ref 35–47)
HDLC SERPL-MCNC: 49 MG/DL
HDLC SERPL: 4.4 (ref 0–5)
HGB BLD-MCNC: 13.6 G/DL (ref 11.5–16)
LDLC SERPL CALC-MCNC: 133.4 MG/DL (ref 0–100)
MCH RBC QN AUTO: 30.2 PG (ref 26–34)
MCHC RBC AUTO-ENTMCNC: 33.9 G/DL (ref 30–36.5)
MCV RBC AUTO: 89.1 FL (ref 80–99)
NRBC # BLD: 0 K/UL (ref 0–0.01)
NRBC BLD-RTO: 0 PER 100 WBC
PLATELET # BLD AUTO: 322 K/UL (ref 150–400)
PMV BLD AUTO: 10.3 FL (ref 8.9–12.9)
POTASSIUM SERPL-SCNC: 4.1 MMOL/L (ref 3.5–5.1)
PROT SERPL-MCNC: 7.2 G/DL (ref 6.4–8.2)
RBC # BLD AUTO: 4.5 M/UL (ref 3.8–5.2)
SODIUM SERPL-SCNC: 136 MMOL/L (ref 136–145)
TRIGL SERPL-MCNC: 158 MG/DL
TSH SERPL DL<=0.05 MIU/L-ACNC: 0.91 UIU/ML (ref 0.36–3.74)
VLDLC SERPL CALC-MCNC: 31.6 MG/DL
WBC # BLD AUTO: 7.2 K/UL (ref 3.6–11)

## 2023-11-30 ENCOUNTER — NURSE TRIAGE (OUTPATIENT)
Dept: OTHER | Facility: CLINIC | Age: 33
End: 2023-11-30

## 2023-11-30 NOTE — TELEPHONE ENCOUNTER
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 Pt washed hands thoroughly. Pt was two very small cuts one on middle finger and one on thumb. \"     Current Symptoms: none    Pain Severity: 0/10; What has been tried: wound cleaning    Recommended disposition: Home Care    Employee injury packet sent to employee with listing of approved providers and locations. Employee aware they must be seen by one of the panel physicians, not their own PCP. Employee verbalizes understanding. Care advice provided, caller verbalizes understanding; denies any other questions or concerns. Pt  agrees to monitor self and to call back for any signs in worsen of condition.       Reason for Disposition   Minor puncture wound    Protocols used: Puncture Wound-ADULT-

## 2024-01-19 LAB
ABO, EXTERNAL RESULT: NORMAL
HEP B, EXTERNAL RESULT: NEGATIVE
HEPATITIS C ANTIBODY, EXTERNAL RESULT: NON REACTIVE
HIV, EXTERNAL RESULT: NON REACTIVE
RH FACTOR, EXTERNAL RESULT: POSITIVE
RUBELLA TITER, EXTERNAL RESULT: NORMAL

## 2024-01-21 LAB
C. TRACHOMATIS, EXTERNAL RESULT: NEGATIVE
N. GONORRHOEAE, EXTERNAL RESULT: NEGATIVE

## 2024-02-22 NOTE — PROCEDURES
Anesthesia Pre Eval Note    Anesthesia ROS/Med Hx    Overall Review:  EKG was reviewed     Anesthetic Complication History:    No History of difficult airway  No PONV    Pulmonary Review:    Negative for COPD   Negative for asthma  The patient is not a smoker.    Neuro/Psych Review:    Negative for seizures   Negative for CVA  Positive for headaches    Cardiovascular Review:   Exercise tolerance: good (>4 METS)  Negative for past MI  Positive for hypertension  Positive for hyperlipidemia    GI/HEPATIC/RENAL Review:     Negative for GERD    End/Other Review:  Negative for diabetes  Positive for hypothyroidism  Positive for cancer      Relevant Problems   No relevant active problems       Physical Exam     Airway   Mallampati: II  TM Distance: >3 FB  Neck ROM: Full    Cardiovascular    Cardio Rhythm: Regular  Cardio Rate: Normal    Head Assessment  Head assessment: Normocephalic    General Assessment  General Assessment: Alert and oriented    Dental Exam  Dental exam normal    Pulmonary Exam  Pulmonary exam normal    Abdominal Exam  Abdominal exam normal      Anesthesia Plan:    ASA Status: 3  Anesthesia Type: General    Induction: Intravenous  Preferred Airway Type: ETT  Maintenance: Inhalational    Post-op Pain Management: Per Surgeon      Checklist  Reviewed: NPO Status, Allergies, Medications, Problem list and Past Med History  Consent/Risks Discussed Statement:  The proposed anesthetic plan, including its risks and benefits, have been discussed with the Patient along with the risks and benefits of alternatives. Questions were encouraged and answered and the patient and/or representative understands and agrees to proceed.        I discussed with the patient (and/or patient's legal representative) the risks and benefits of the proposed anesthesia plan, General, which may include services performed by other anesthesia providers.    Alternative anesthesia plans, if available, were reviewed with the patient (and/or  NAME:  Fabiola Liao   :   1990   MRN:   [id number]     Date/Time:  2018 9:39 AM    Esophagogastroduodenoscopy (EGD) Procedure Note    Procedure: Esophagogastroduodenoscopy with biopsy    Indication:  GERD  Pre-operative Diagnosis: see indication above  Post-operative Diagnosis: see findings below  :  Ivis Clemens MD  Referring Provider:   -Yosef Ocampo MD    Exam:  Airway: clear, no airway problems anticipated  Heart: RRR, without gallops or rubs  Lungs: clear bilaterally without wheezes, crackles, or rhonchi  Abdomen: soft, nontender, nondistended, bowel sounds present  Mental Status: awake, alert and oriented to person, place and time     Anethesia/Sedation:  MAC anesthesia Propofol  Procedure Details   After informed consent was obtained for the procedure, with all risks and benefits of procedure explained the patient was taken to the endoscopy suite and placed in the left lateral decubitus position. Following sequential administration of sedation as per above, the [id number] gastroscope was inserted into the mouth and advanced under direct vision to second portion of the duodenum. A careful inspection was made as the gastroscope was withdrawn, including a retroflexed view of the proximal stomach; findings and interventions are described below. Findings:    -Normal esophageal mucosa; biopsied to exclude esophagitis  -Normal stomach  -Normal duodenum    Therapies:  biopsy of esophagus  Specimens: #1 g-e jxn  EBL:  None. Complications:   None; patient tolerated the procedure well. Impression:    -Normal esophageal mucosa; biopsied to exclude esophagitis  -Normal stomach  -Normal duodenum    Recommendations:  -Continue acid suppression. , -Await pathology.     Discharge disposition:  Home in the company of  when able to ambulate    Ivis Clemens MD patient's legal representative). Discussion has been held with the patient (and/or patient's legal representative) regarding risks of anesthesia, which include Nausea, Vomiting and Dental Injury and emergent situations that may require change in anesthesia plan.    The patient (and/or patient's legal representative) has indicated understanding, his/her questions have been answered, and he/she wishes to proceed with the planned anesthetic.    Blood Products: Not Anticipated

## 2024-06-12 LAB — T. PALLIDUM (SYPHILIS) ANTIBODY, EXTERNAL RESULT: NON REACTIVE

## 2024-07-14 LAB — GBS, EXTERNAL RESULT: NEGATIVE

## 2024-08-09 ENCOUNTER — HOSPITAL ENCOUNTER (INPATIENT)
Facility: HOSPITAL | Age: 34
LOS: 3 days | Discharge: HOME | End: 2024-08-12
Attending: OBSTETRICS & GYNECOLOGY | Admitting: OBSTETRICS & GYNECOLOGY
Payer: COMMERCIAL

## 2024-08-09 PROBLEM — O47.9 UTERINE CONTRACTIONS: Status: ACTIVE | Noted: 2024-08-09

## 2024-08-09 LAB
AMNIOTEST, POC: ABNORMAL
Lab: ABNORMAL
NEGATIVE QC PASS/FAIL: ABNORMAL
POSITIVE QC PASS/FAIL: ABNORMAL

## 2024-08-09 PROCEDURE — 4500000002 HC ER NO CHARGE

## 2024-08-09 PROCEDURE — 86900 BLOOD TYPING SEROLOGIC ABO: CPT

## 2024-08-09 PROCEDURE — 86850 RBC ANTIBODY SCREEN: CPT

## 2024-08-09 PROCEDURE — 86901 BLOOD TYPING SEROLOGIC RH(D): CPT

## 2024-08-09 PROCEDURE — 80053 COMPREHEN METABOLIC PANEL: CPT

## 2024-08-09 PROCEDURE — 7210000100 HC LABOR FEE PER 1 HR

## 2024-08-09 PROCEDURE — 85027 COMPLETE CBC AUTOMATED: CPT

## 2024-08-09 PROCEDURE — 1100000000 HC RM PRIVATE

## 2024-08-09 PROCEDURE — 86592 SYPHILIS TEST NON-TREP QUAL: CPT

## 2024-08-09 PROCEDURE — 86920 COMPATIBILITY TEST SPIN: CPT

## 2024-08-09 RX ORDER — FERROUS SULFATE 325(65) MG
325 TABLET ORAL
COMMUNITY

## 2024-08-09 RX ORDER — LIDOCAINE HYDROCHLORIDE 10 MG/ML
30 INJECTION, SOLUTION EPIDURAL; INFILTRATION; INTRACAUDAL; PERINEURAL PRN
Status: DISCONTINUED | OUTPATIENT
Start: 2024-08-09 | End: 2024-08-10

## 2024-08-09 RX ORDER — TERBUTALINE SULFATE 1 MG/ML
0.25 INJECTION, SOLUTION SUBCUTANEOUS
Status: DISCONTINUED | OUTPATIENT
Start: 2024-08-09 | End: 2024-08-10

## 2024-08-09 RX ORDER — SEVOFLURANE 250 ML/250ML
1 LIQUID RESPIRATORY (INHALATION) CONTINUOUS PRN
Status: DISCONTINUED | OUTPATIENT
Start: 2024-08-09 | End: 2024-08-10

## 2024-08-09 RX ORDER — HYDROMORPHONE HYDROCHLORIDE 1 MG/ML
1 INJECTION, SOLUTION INTRAMUSCULAR; INTRAVENOUS; SUBCUTANEOUS
Status: DISCONTINUED | OUTPATIENT
Start: 2024-08-09 | End: 2024-08-10

## 2024-08-09 RX ORDER — ACETAMINOPHEN 500 MG
1000 TABLET ORAL EVERY 8 HOURS PRN
Status: DISCONTINUED | OUTPATIENT
Start: 2024-08-09 | End: 2024-08-10

## 2024-08-09 RX ORDER — SODIUM CHLORIDE 0.9 % (FLUSH) 0.9 %
5-40 SYRINGE (ML) INJECTION PRN
Status: DISCONTINUED | OUTPATIENT
Start: 2024-08-09 | End: 2024-08-10

## 2024-08-09 RX ORDER — SODIUM CHLORIDE 0.9 % (FLUSH) 0.9 %
5-40 SYRINGE (ML) INJECTION EVERY 12 HOURS SCHEDULED
Status: DISCONTINUED | OUTPATIENT
Start: 2024-08-09 | End: 2024-08-10

## 2024-08-09 RX ORDER — ASPIRIN 81 MG/1
81 TABLET, CHEWABLE ORAL DAILY
Status: ON HOLD | COMMUNITY
End: 2024-08-11 | Stop reason: HOSPADM

## 2024-08-09 RX ORDER — SODIUM CHLORIDE 9 MG/ML
25 INJECTION, SOLUTION INTRAVENOUS PRN
Status: DISCONTINUED | OUTPATIENT
Start: 2024-08-09 | End: 2024-08-10

## 2024-08-10 LAB
ALBUMIN SERPL-MCNC: 3 G/DL (ref 3.5–5)
ALBUMIN/GLOB SERPL: 0.8 (ref 1.1–2.2)
ALP SERPL-CCNC: 113 U/L (ref 45–117)
ALT SERPL-CCNC: 18 U/L (ref 12–78)
ANION GAP SERPL CALC-SCNC: 7 MMOL/L (ref 5–15)
APTT PPP: 23.1 SEC (ref 22.1–31)
AST SERPL-CCNC: 54 U/L (ref 15–37)
BASOPHILS # BLD: 0 K/UL (ref 0–0.1)
BASOPHILS NFR BLD: 0 % (ref 0–1)
BILIRUB SERPL-MCNC: 0.4 MG/DL (ref 0.2–1)
BUN SERPL-MCNC: 13 MG/DL (ref 6–20)
BUN/CREAT SERPL: 26 (ref 12–20)
CALCIUM SERPL-MCNC: 9.8 MG/DL (ref 8.5–10.1)
CHLORIDE SERPL-SCNC: 106 MMOL/L (ref 97–108)
CO2 SERPL-SCNC: 23 MMOL/L (ref 21–32)
CREAT SERPL-MCNC: 0.5 MG/DL (ref 0.55–1.02)
DIFFERENTIAL METHOD BLD: ABNORMAL
EOSINOPHIL # BLD: 0 K/UL (ref 0–0.4)
EOSINOPHIL NFR BLD: 0 % (ref 0–7)
ERYTHROCYTE [DISTWIDTH] IN BLOOD BY AUTOMATED COUNT: 14.5 % (ref 11.5–14.5)
ERYTHROCYTE [DISTWIDTH] IN BLOOD BY AUTOMATED COUNT: 14.6 % (ref 11.5–14.5)
FIBRINOGEN PPP-MCNC: 379 MG/DL (ref 200–475)
GLOBULIN SER CALC-MCNC: 3.7 G/DL (ref 2–4)
GLUCOSE BLD STRIP.AUTO-MCNC: 99 MG/DL (ref 65–117)
GLUCOSE SERPL-MCNC: 95 MG/DL (ref 65–100)
HCT VFR BLD AUTO: 26.6 % (ref 35–47)
HCT VFR BLD AUTO: 33.6 % (ref 35–47)
HGB BLD-MCNC: 11.1 G/DL (ref 11.5–16)
HGB BLD-MCNC: 8.6 G/DL (ref 11.5–16)
HGB BLD-MCNC: 8.7 G/DL (ref 11.5–16)
HISTORY CHECK: NORMAL
IMM GRANULOCYTES # BLD AUTO: 0.2 K/UL (ref 0–0.04)
IMM GRANULOCYTES NFR BLD AUTO: 1 % (ref 0–0.5)
INR PPP: 1 (ref 0.9–1.1)
LYMPHOCYTES # BLD: 0.8 K/UL (ref 0.8–3.5)
LYMPHOCYTES NFR BLD: 5 % (ref 12–49)
MCH RBC QN AUTO: 28.6 PG (ref 26–34)
MCH RBC QN AUTO: 28.8 PG (ref 26–34)
MCHC RBC AUTO-ENTMCNC: 32.3 G/DL (ref 30–36.5)
MCHC RBC AUTO-ENTMCNC: 33 G/DL (ref 30–36.5)
MCV RBC AUTO: 86.6 FL (ref 80–99)
MCV RBC AUTO: 89 FL (ref 80–99)
MONOCYTES # BLD: 1 K/UL (ref 0–1)
MONOCYTES NFR BLD: 6 % (ref 5–13)
NEUTS SEG # BLD: 13.9 K/UL (ref 1.8–8)
NEUTS SEG NFR BLD: 88 % (ref 32–75)
NRBC # BLD: 0 K/UL (ref 0–0.01)
NRBC # BLD: 0 K/UL (ref 0–0.01)
NRBC BLD-RTO: 0 PER 100 WBC
NRBC BLD-RTO: 0 PER 100 WBC
PLATELET # BLD AUTO: 232 K/UL (ref 150–400)
PLATELET # BLD AUTO: 263 K/UL (ref 150–400)
PMV BLD AUTO: 10.3 FL (ref 8.9–12.9)
PMV BLD AUTO: 10.4 FL (ref 8.9–12.9)
POTASSIUM SERPL-SCNC: 5.2 MMOL/L (ref 3.5–5.1)
PROT SERPL-MCNC: 6.7 G/DL (ref 6.4–8.2)
PROTHROMBIN TIME: 10.4 SEC (ref 9–11.1)
RBC # BLD AUTO: 2.99 M/UL (ref 3.8–5.2)
RBC # BLD AUTO: 3.88 M/UL (ref 3.8–5.2)
RBC MORPH BLD: ABNORMAL
SERVICE CMNT-IMP: NORMAL
SODIUM SERPL-SCNC: 136 MMOL/L (ref 136–145)
THERAPEUTIC RANGE: NORMAL SECS (ref 58–77)
WBC # BLD AUTO: 10.3 K/UL (ref 3.6–11)
WBC # BLD AUTO: 15.9 K/UL (ref 3.6–11)

## 2024-08-10 PROCEDURE — 10H07YZ INSERTION OF OTHER DEVICE INTO PRODUCTS OF CONCEPTION, VIA NATURAL OR ARTIFICIAL OPENING: ICD-10-PCS | Performed by: OBSTETRICS & GYNECOLOGY

## 2024-08-10 PROCEDURE — 6360000002 HC RX W HCPCS: Performed by: MIDWIFE

## 2024-08-10 PROCEDURE — 85025 COMPLETE CBC W/AUTO DIFF WBC: CPT

## 2024-08-10 PROCEDURE — 85730 THROMBOPLASTIN TIME PARTIAL: CPT

## 2024-08-10 PROCEDURE — 6370000000 HC RX 637 (ALT 250 FOR IP): Performed by: MIDWIFE

## 2024-08-10 PROCEDURE — 36430 TRANSFUSION BLD/BLD COMPNT: CPT

## 2024-08-10 PROCEDURE — 85018 HEMOGLOBIN: CPT

## 2024-08-10 PROCEDURE — 7210000100 HC LABOR FEE PER 1 HR

## 2024-08-10 PROCEDURE — 2580000003 HC RX 258: Performed by: MIDWIFE

## 2024-08-10 PROCEDURE — 99284 EMERGENCY DEPT VISIT MOD MDM: CPT

## 2024-08-10 PROCEDURE — 36416 COLLJ CAPILLARY BLOOD SPEC: CPT

## 2024-08-10 PROCEDURE — 0HQ9XZZ REPAIR PERINEUM SKIN, EXTERNAL APPROACH: ICD-10-PCS | Performed by: OBSTETRICS & GYNECOLOGY

## 2024-08-10 PROCEDURE — P9016 RBC LEUKOCYTES REDUCED: HCPCS

## 2024-08-10 PROCEDURE — 85610 PROTHROMBIN TIME: CPT

## 2024-08-10 PROCEDURE — 2500000003 HC RX 250 WO HCPCS: Performed by: MIDWIFE

## 2024-08-10 PROCEDURE — 7220000101 HC DELIVERY VAGINAL/SINGLE

## 2024-08-10 PROCEDURE — 85384 FIBRINOGEN ACTIVITY: CPT

## 2024-08-10 PROCEDURE — 1120000000 HC RM PRIVATE OB

## 2024-08-10 PROCEDURE — 82962 GLUCOSE BLOOD TEST: CPT

## 2024-08-10 RX ORDER — IBUPROFEN 400 MG/1
800 TABLET ORAL EVERY 8 HOURS SCHEDULED
Status: DISCONTINUED | OUTPATIENT
Start: 2024-08-10 | End: 2024-08-12 | Stop reason: HOSPADM

## 2024-08-10 RX ORDER — MISOPROSTOL 200 UG/1
400 TABLET ORAL PRN
Status: DISCONTINUED | OUTPATIENT
Start: 2024-08-10 | End: 2024-08-10

## 2024-08-10 RX ORDER — 0.9 % SODIUM CHLORIDE 0.9 %
250 INTRAVENOUS SOLUTION INTRAVENOUS ONCE
Status: DISCONTINUED | OUTPATIENT
Start: 2024-08-10 | End: 2024-08-10

## 2024-08-10 RX ORDER — SODIUM CHLORIDE 9 MG/ML
INJECTION, SOLUTION INTRAVENOUS PRN
Status: DISCONTINUED | OUTPATIENT
Start: 2024-08-10 | End: 2024-08-12 | Stop reason: HOSPADM

## 2024-08-10 RX ORDER — ALBUMIN, HUMAN INJ 5% 5 %
25 SOLUTION INTRAVENOUS ONCE
Status: DISCONTINUED | OUTPATIENT
Start: 2024-08-10 | End: 2024-08-12 | Stop reason: HOSPADM

## 2024-08-10 RX ORDER — LIDOCAINE HYDROCHLORIDE 10 MG/ML
INJECTION, SOLUTION INFILTRATION; PERINEURAL
Status: DISCONTINUED
Start: 2024-08-10 | End: 2024-08-10

## 2024-08-10 RX ORDER — SODIUM CHLORIDE 0.9 % (FLUSH) 0.9 %
5-40 SYRINGE (ML) INJECTION PRN
Status: DISCONTINUED | OUTPATIENT
Start: 2024-08-10 | End: 2024-08-12 | Stop reason: HOSPADM

## 2024-08-10 RX ORDER — ONDANSETRON 4 MG/1
4 TABLET, ORALLY DISINTEGRATING ORAL EVERY 6 HOURS PRN
Status: DISCONTINUED | OUTPATIENT
Start: 2024-08-10 | End: 2024-08-10

## 2024-08-10 RX ORDER — MODIFIED LANOLIN
OINTMENT (GRAM) TOPICAL PRN
Status: DISCONTINUED | OUTPATIENT
Start: 2024-08-10 | End: 2024-08-12 | Stop reason: HOSPADM

## 2024-08-10 RX ORDER — DOCUSATE SODIUM 100 MG/1
100 CAPSULE, LIQUID FILLED ORAL 2 TIMES DAILY
Status: DISCONTINUED | OUTPATIENT
Start: 2024-08-10 | End: 2024-08-12 | Stop reason: HOSPADM

## 2024-08-10 RX ORDER — SODIUM CHLORIDE 9 MG/ML
INJECTION, SOLUTION INTRAVENOUS PRN
Status: DISCONTINUED | OUTPATIENT
Start: 2024-08-10 | End: 2024-08-12

## 2024-08-10 RX ORDER — DIPHENHYDRAMINE HCL 25 MG
25 CAPSULE ORAL EVERY 4 HOURS PRN
Status: DISCONTINUED | OUTPATIENT
Start: 2024-08-10 | End: 2024-08-10

## 2024-08-10 RX ORDER — ACETAMINOPHEN 500 MG
1000 TABLET ORAL EVERY 8 HOURS SCHEDULED
Status: DISCONTINUED | OUTPATIENT
Start: 2024-08-10 | End: 2024-08-12 | Stop reason: HOSPADM

## 2024-08-10 RX ORDER — SODIUM CHLORIDE, SODIUM LACTATE, POTASSIUM CHLORIDE, CALCIUM CHLORIDE 600; 310; 30; 20 MG/100ML; MG/100ML; MG/100ML; MG/100ML
INJECTION, SOLUTION INTRAVENOUS CONTINUOUS
Status: DISCONTINUED | OUTPATIENT
Start: 2024-08-10 | End: 2024-08-10

## 2024-08-10 RX ORDER — DIPHENHYDRAMINE HYDROCHLORIDE 50 MG/ML
25 INJECTION INTRAMUSCULAR; INTRAVENOUS SEE ADMIN INSTRUCTIONS
Status: COMPLETED | OUTPATIENT
Start: 2024-08-10 | End: 2024-08-10

## 2024-08-10 RX ORDER — ONDANSETRON 2 MG/ML
4 INJECTION INTRAMUSCULAR; INTRAVENOUS EVERY 6 HOURS PRN
Status: DISCONTINUED | OUTPATIENT
Start: 2024-08-10 | End: 2024-08-12 | Stop reason: HOSPADM

## 2024-08-10 RX ORDER — EPHEDRINE SULFATE 50 MG/ML
10 INJECTION INTRAVENOUS
Status: DISCONTINUED | OUTPATIENT
Start: 2024-08-10 | End: 2024-08-10

## 2024-08-10 RX ORDER — FENTANYL/BUPIVACAINE/NS/PF 2-1250MCG
1-15 PLASTIC BAG, INJECTION (ML) INJECTION CONTINUOUS
Status: DISCONTINUED | OUTPATIENT
Start: 2024-08-10 | End: 2024-08-10

## 2024-08-10 RX ORDER — SODIUM CHLORIDE 9 MG/ML
INJECTION, SOLUTION INTRAVENOUS ONCE
Status: DISCONTINUED | OUTPATIENT
Start: 2024-08-10 | End: 2024-08-10

## 2024-08-10 RX ORDER — SODIUM CHLORIDE, SODIUM LACTATE, POTASSIUM CHLORIDE, AND CALCIUM CHLORIDE .6; .31; .03; .02 G/100ML; G/100ML; G/100ML; G/100ML
500 INJECTION, SOLUTION INTRAVENOUS PRN
Status: DISCONTINUED | OUTPATIENT
Start: 2024-08-10 | End: 2024-08-10

## 2024-08-10 RX ORDER — ONDANSETRON 2 MG/ML
4 INJECTION INTRAMUSCULAR; INTRAVENOUS EVERY 6 HOURS PRN
Status: DISCONTINUED | OUTPATIENT
Start: 2024-08-10 | End: 2024-08-10

## 2024-08-10 RX ORDER — METHYLERGONOVINE MALEATE 0.2 MG/ML
200 INJECTION INTRAVENOUS PRN
Status: DISCONTINUED | OUTPATIENT
Start: 2024-08-10 | End: 2024-08-10

## 2024-08-10 RX ORDER — CARBOPROST TROMETHAMINE 250 UG/ML
250 INJECTION, SOLUTION INTRAMUSCULAR PRN
Status: DISCONTINUED | OUTPATIENT
Start: 2024-08-10 | End: 2024-08-10

## 2024-08-10 RX ORDER — SODIUM CHLORIDE, SODIUM LACTATE, POTASSIUM CHLORIDE, CALCIUM CHLORIDE 600; 310; 30; 20 MG/100ML; MG/100ML; MG/100ML; MG/100ML
INJECTION, SOLUTION INTRAVENOUS CONTINUOUS
Status: DISCONTINUED | OUTPATIENT
Start: 2024-08-10 | End: 2024-08-12 | Stop reason: HOSPADM

## 2024-08-10 RX ORDER — ONDANSETRON 4 MG/1
4 TABLET, ORALLY DISINTEGRATING ORAL EVERY 6 HOURS PRN
Status: DISCONTINUED | OUTPATIENT
Start: 2024-08-10 | End: 2024-08-12 | Stop reason: HOSPADM

## 2024-08-10 RX ORDER — NALOXONE HYDROCHLORIDE 0.4 MG/ML
INJECTION, SOLUTION INTRAMUSCULAR; INTRAVENOUS; SUBCUTANEOUS PRN
Status: DISCONTINUED | OUTPATIENT
Start: 2024-08-10 | End: 2024-08-10

## 2024-08-10 RX ORDER — SODIUM CHLORIDE 0.9 % (FLUSH) 0.9 %
5-40 SYRINGE (ML) INJECTION EVERY 12 HOURS SCHEDULED
Status: DISCONTINUED | OUTPATIENT
Start: 2024-08-10 | End: 2024-08-12 | Stop reason: HOSPADM

## 2024-08-10 RX ADMIN — SODIUM CHLORIDE, POTASSIUM CHLORIDE, SODIUM LACTATE AND CALCIUM CHLORIDE: 600; 310; 30; 20 INJECTION, SOLUTION INTRAVENOUS at 03:11

## 2024-08-10 RX ADMIN — OXYTOCIN 87.3 MILLI-UNITS/MIN: 10 INJECTION, SOLUTION INTRAMUSCULAR; INTRAVENOUS at 12:10

## 2024-08-10 RX ADMIN — SODIUM CHLORIDE, POTASSIUM CHLORIDE, SODIUM LACTATE AND CALCIUM CHLORIDE 500 ML: 600; 310; 30; 20 INJECTION, SOLUTION INTRAVENOUS at 03:13

## 2024-08-10 RX ADMIN — DOCUSATE SODIUM 100 MG: 100 CAPSULE, LIQUID FILLED ORAL at 20:51

## 2024-08-10 RX ADMIN — DIPHENHYDRAMINE HYDROCHLORIDE 25 MG: 50 INJECTION INTRAMUSCULAR; INTRAVENOUS at 17:13

## 2024-08-10 RX ADMIN — SODIUM CHLORIDE, POTASSIUM CHLORIDE, SODIUM LACTATE AND CALCIUM CHLORIDE 500 ML: 600; 310; 30; 20 INJECTION, SOLUTION INTRAVENOUS at 12:26

## 2024-08-10 RX ADMIN — ACETAMINOPHEN 1000 MG: 500 TABLET ORAL at 20:50

## 2024-08-10 RX ADMIN — Medication 166.7 ML: at 14:05

## 2024-08-10 RX ADMIN — TRANEXAMIC ACID 1000 MG: 100 INJECTION, SOLUTION INTRAVENOUS at 12:33

## 2024-08-10 RX ADMIN — SODIUM CHLORIDE, POTASSIUM CHLORIDE, SODIUM LACTATE AND CALCIUM CHLORIDE: 600; 310; 30; 20 INJECTION, SOLUTION INTRAVENOUS at 20:54

## 2024-08-10 ASSESSMENT — PAIN SCALES - GENERAL: PAINLEVEL_OUTOF10: 2

## 2024-08-10 ASSESSMENT — PAIN DESCRIPTION - DESCRIPTORS: DESCRIPTORS: SORE

## 2024-08-10 ASSESSMENT — PAIN DESCRIPTION - LOCATION: LOCATION: PERINEUM

## 2024-08-10 NOTE — ED TRIAGE NOTES
2247 Patient arrived to LIVE complaining of leaking of fluid, she noted a gush at 9 pm and started feeling contractions shortly after that are every 3-4 minutes. Reports appropriate fetal movement. History obtained, EFM applied, vitals obtained. Nitrazine positive.     2255 CNM notified patient ready to be seen.     2305 SVE 3/70/-2. Will admit for labor.    2329 transferred to L&D 11, will do intermittent monitoring per CNM orders.     0257 Patient requesting pain medication at this time. EFM applied, variables noted. See flowsheets for interventions.     0320 SVE 3-4/80/-2    0335 CNM notified for continued variable decelerations despite interventions, CNM paulino to come and assess    0354 IUPC and FSE applied, orders for amnioinfusion given. Patient verbalized understanding and agrees with plan, see MAR     0545 Patient resting in between contractions, states she is feeling better now than before. Variable decels are less frequent than prior to amnioinfusion.     2294 Bedside shift change report given to MADISON Adrian RN (oncoming nurse) by ALPHONSO Guardado RN (offgoing nurse). Report included the following information Nurse Handoff Report.

## 2024-08-10 NOTE — H&P
(GLUCOPHAGE) 1000 MG tablet   Yes Yes   Sig: Take 1 tablet by mouth 2 times daily (with meals)      Facility-Administered Medications: None       Allergies: No Known Allergies    Pertinent ROS: Review of Systems - Negative except leaking fluid, pregnancy, prior pregnancies (1 , 2 SABs)    Family Hx: No family history on file.    Social Hs:   Social History     Socioeconomic History    Marital status: Not on file     Spouse name: Not on file    Number of children: Not on file    Years of education: Not on file    Highest education level: Not on file   Occupational History    Not on file   Tobacco Use    Smoking status: Never    Smokeless tobacco: Never   Substance and Sexual Activity    Alcohol use: Not Currently    Drug use: Never    Sexual activity: Yes   Other Topics Concern    Not on file   Social History Narrative    Not on file     Social Determinants of Health     Financial Resource Strain: Not on file   Food Insecurity: No Food Insecurity (2024)    Hunger Vital Sign     Worried About Running Out of Food in the Last Year: Never true     Ran Out of Food in the Last Year: Never true   Transportation Needs: No Transportation Needs (2024)    PRAPARE - Transportation     Lack of Transportation (Medical): No     Lack of Transportation (Non-Medical): No   Physical Activity: Not on file   Stress: Not on file   Social Connections: Not on file   Intimate Partner Violence: Not on file   Housing Stability: Low Risk  (2024)    Housing Stability Vital Sign     Unable to Pay for Housing in the Last Year: No     Number of Places Lived in the Last Year: 1     Unstable Housing in the Last Year: No       OBJECTIVE:  /74   Pulse 97   Temp 98.1 °F (36.7 °C) (Oral)   Resp 16   SpO2 100%     FHR baseline 145 10 x10 Accels present. No decels present   Ctx: TOCO: irreg. Q 4-6. Mild to palpation. Appropriate resting tone. No tachysystole noted     Exam:  General: alert & oriented x3  HEENT:  normal   Lungs:

## 2024-08-10 NOTE — L&D DELIVERY NOTE
Called to patient's room for delivery. She pushed for approximately 10 min. Terminal bradycardia noted. Initially pushing in hands and knees and then turned over to supine. Vertex delivered with spontaneous maternal pushing efforts over supported perineum. Quadruple nuchal noted and reduced. Anterior shoulder delivered with gentle downward traction from CNM followed immediately by posterior shoulder and body. Infant noted to be vigorous. Placed on maternal abdomen. Drying/tactile stimulation and bulb suction by RN staff. Delayed cord clamping. Vulva, vagina and perineum inspected and found to have 1st degree lac.. Repaired completed in usual fashion to obtain excellent hemostasis under local anesthesia. Cord double clamped by CNM and then cut by FOB. Infant skin to skin with mother. Gallito placenta delivered intact. Pitocin IV per protocol. Renée care completed. Mom and baby doing well, left stable and attended.          DAWIT Sheriff Kenya [[id number]]      Labor Events     Labor: No   Steroids: None  Cervical Ripening Date/Time:        Rupture Date/Time:  24 21:00:00   Rupture Type: SROM  Fluid Color: Bloody Show  Fluid Odor: None  Fluid Volume: Moderate       Anesthesia    Method: None       Labor Event Times      Labor onset date/time:  8/10/24      Dilation complete date/time:  8/10/24 11:00:00 EDT     Start pushing date/time:  8/10/2024 11:03:00   Decision date/time (emergent ):            Delivery Details      Delivery Date: 8/10/24 Delivery Time: 11:14:00   Delivery Type: Vaginal, Spontaneous               Presentation    Presentation: Vertex       Shoulder Dystocia    Shoulder Dystocia Present?: No       Assisted Delivery Details    Forceps Attempted?: No  Vacuum Extractor Attempted?: No                           Cord    Vessels: 3 Vessels  Complications: Wrapped  Delayed Cord Clamping?: Yes              Placenta           Lacerations           Vaginal Counts    Initial

## 2024-08-10 NOTE — ED NOTES
HPI Patient is a 34 y.o.  @ 39w6d with pietro of 8/10/2024 who presents to the LIVE at 2247 reporting gush of fluid at 2000. She has also begun to have some irregular contractions. Today, she reports good fetal movement and denies vaginal bleeding, nausea/vomiting/diarrhea, fever, cough, or sore throat. She also denies any SOB, loss of taste/smell, exposure to anyone with COVID, in addition to headache, changes in vision, or RUQ/epigastric pain. She reports regular prenatal care with [facility]. She takes sertraline for anxiety and also metformin daily for insulin resistance.        Chief Complaint   Patient presents with    Rupture of Membranes       Past Medical History:   Diagnosis Date    Anemia     Diabetes mellitus (HCC)     Mental disorder        No past surgical history on file.      No family history on file.    Social History     Socioeconomic History    Marital status: Not on file     Spouse name: Not on file    Number of children: Not on file    Years of education: Not on file    Highest education level: Not on file   Occupational History    Not on file   Tobacco Use    Smoking status: Never    Smokeless tobacco: Never   Substance and Sexual Activity    Alcohol use: Not Currently    Drug use: Never    Sexual activity: Yes   Other Topics Concern    Not on file   Social History Narrative    Not on file     Social Determinants of Health     Financial Resource Strain: Not on file   Food Insecurity: No Food Insecurity (2024)    Hunger Vital Sign     Worried About Running Out of Food in the Last Year: Never true     Ran Out of Food in the Last Year: Never true   Transportation Needs: No Transportation Needs (2024)    PRAPARE - Transportation     Lack of Transportation (Medical): No     Lack of Transportation (Non-Medical): No   Physical Activity: Not on file   Stress: Not on file   Social Connections: Not on file   Intimate Partner Violence: Not on file   Housing Stability: Low Risk  (2024)    Housing

## 2024-08-11 LAB
BASOPHILS # BLD: 0.1 K/UL (ref 0–0.1)
BASOPHILS NFR BLD: 0 % (ref 0–1)
DIFFERENTIAL METHOD BLD: ABNORMAL
EOSINOPHIL # BLD: 0.1 K/UL (ref 0–0.4)
EOSINOPHIL NFR BLD: 0 % (ref 0–7)
ERYTHROCYTE [DISTWIDTH] IN BLOOD BY AUTOMATED COUNT: 15.5 % (ref 11.5–14.5)
ERYTHROCYTE [DISTWIDTH] IN BLOOD BY AUTOMATED COUNT: 15.7 % (ref 11.5–14.5)
GLUCOSE SERPL-MCNC: 104 MG/DL (ref 65–100)
HCT VFR BLD AUTO: 21 % (ref 35–47)
HCT VFR BLD AUTO: 21.6 % (ref 35–47)
HGB BLD-MCNC: 6.7 G/DL (ref 11.5–16)
HGB BLD-MCNC: 7 G/DL (ref 11.5–16)
HISTORY CHECK: NORMAL
IMM GRANULOCYTES # BLD AUTO: 0.1 K/UL (ref 0–0.04)
IMM GRANULOCYTES NFR BLD AUTO: 1 % (ref 0–0.5)
LYMPHOCYTES # BLD: 3.2 K/UL (ref 0.8–3.5)
LYMPHOCYTES NFR BLD: 23 % (ref 12–49)
MCH RBC QN AUTO: 28.8 PG (ref 26–34)
MCH RBC QN AUTO: 28.8 PG (ref 26–34)
MCHC RBC AUTO-ENTMCNC: 31.9 G/DL (ref 30–36.5)
MCHC RBC AUTO-ENTMCNC: 32.4 G/DL (ref 30–36.5)
MCV RBC AUTO: 88.9 FL (ref 80–99)
MCV RBC AUTO: 90.1 FL (ref 80–99)
MONOCYTES # BLD: 1.3 K/UL (ref 0–1)
MONOCYTES NFR BLD: 9 % (ref 5–13)
NEUTS SEG # BLD: 9.4 K/UL (ref 1.8–8)
NEUTS SEG NFR BLD: 67 % (ref 32–75)
NRBC # BLD: 0 K/UL (ref 0–0.01)
NRBC # BLD: 0 K/UL (ref 0–0.01)
NRBC BLD-RTO: 0 PER 100 WBC
NRBC BLD-RTO: 0 PER 100 WBC
PLATELET # BLD AUTO: 216 K/UL (ref 150–400)
PLATELET # BLD AUTO: 227 K/UL (ref 150–400)
PMV BLD AUTO: 10.1 FL (ref 8.9–12.9)
PMV BLD AUTO: 10.2 FL (ref 8.9–12.9)
RBC # BLD AUTO: 2.33 M/UL (ref 3.8–5.2)
RBC # BLD AUTO: 2.43 M/UL (ref 3.8–5.2)
WBC # BLD AUTO: 12.7 K/UL (ref 3.6–11)
WBC # BLD AUTO: 14.1 K/UL (ref 3.6–11)

## 2024-08-11 PROCEDURE — 6370000000 HC RX 637 (ALT 250 FOR IP): Performed by: OBSTETRICS & GYNECOLOGY

## 2024-08-11 PROCEDURE — 6360000002 HC RX W HCPCS: Performed by: OBSTETRICS & GYNECOLOGY

## 2024-08-11 PROCEDURE — 36430 TRANSFUSION BLD/BLD COMPNT: CPT

## 2024-08-11 PROCEDURE — 85025 COMPLETE CBC W/AUTO DIFF WBC: CPT

## 2024-08-11 PROCEDURE — 85027 COMPLETE CBC AUTOMATED: CPT

## 2024-08-11 PROCEDURE — 30233N1 TRANSFUSION OF NONAUTOLOGOUS RED BLOOD CELLS INTO PERIPHERAL VEIN, PERCUTANEOUS APPROACH: ICD-10-PCS | Performed by: OBSTETRICS & GYNECOLOGY

## 2024-08-11 PROCEDURE — 82947 ASSAY GLUCOSE BLOOD QUANT: CPT

## 2024-08-11 PROCEDURE — 6370000000 HC RX 637 (ALT 250 FOR IP): Performed by: MIDWIFE

## 2024-08-11 PROCEDURE — 1120000000 HC RM PRIVATE OB

## 2024-08-11 PROCEDURE — 2580000003 HC RX 258: Performed by: OBSTETRICS & GYNECOLOGY

## 2024-08-11 PROCEDURE — 2580000003 HC RX 258: Performed by: MIDWIFE

## 2024-08-11 PROCEDURE — P9016 RBC LEUKOCYTES REDUCED: HCPCS

## 2024-08-11 PROCEDURE — 36415 COLL VENOUS BLD VENIPUNCTURE: CPT

## 2024-08-11 RX ORDER — IBUPROFEN 800 MG/1
800 TABLET ORAL EVERY 8 HOURS PRN
Qty: 120 TABLET | Refills: 3 | Status: SHIPPED | OUTPATIENT
Start: 2024-08-11

## 2024-08-11 RX ORDER — SODIUM CHLORIDE 9 MG/ML
INJECTION, SOLUTION INTRAVENOUS PRN
Status: DISCONTINUED | OUTPATIENT
Start: 2024-08-11 | End: 2024-08-12

## 2024-08-11 RX ORDER — PSEUDOEPHEDRINE HCL 30 MG
100 TABLET ORAL 2 TIMES DAILY
Status: SHIPPED | COMMUNITY
Start: 2024-08-11

## 2024-08-11 RX ADMIN — METFORMIN HYDROCHLORIDE 1000 MG: 500 TABLET ORAL at 16:17

## 2024-08-11 RX ADMIN — DOCUSATE SODIUM 100 MG: 100 CAPSULE, LIQUID FILLED ORAL at 08:24

## 2024-08-11 RX ADMIN — METFORMIN HYDROCHLORIDE 1000 MG: 500 TABLET ORAL at 08:23

## 2024-08-11 RX ADMIN — SODIUM CHLORIDE, PRESERVATIVE FREE 10 ML: 5 INJECTION INTRAVENOUS at 11:26

## 2024-08-11 RX ADMIN — ACETAMINOPHEN 1000 MG: 500 TABLET ORAL at 04:14

## 2024-08-11 RX ADMIN — SODIUM CHLORIDE 125 MG: 9 INJECTION, SOLUTION INTRAVENOUS at 10:23

## 2024-08-11 RX ADMIN — ACETAMINOPHEN 1000 MG: 500 TABLET ORAL at 12:36

## 2024-08-11 RX ADMIN — SERTRALINE HYDROCHLORIDE 150 MG: 50 TABLET ORAL at 10:13

## 2024-08-11 RX ADMIN — ACETAMINOPHEN 1000 MG: 500 TABLET ORAL at 20:13

## 2024-08-11 RX ADMIN — SODIUM CHLORIDE, PRESERVATIVE FREE 10 ML: 5 INJECTION INTRAVENOUS at 08:39

## 2024-08-11 ASSESSMENT — PAIN SCALES - GENERAL
PAINLEVEL_OUTOF10: 2

## 2024-08-11 ASSESSMENT — PAIN DESCRIPTION - LOCATION: LOCATION: PERINEUM

## 2024-08-11 ASSESSMENT — PAIN DESCRIPTION - DESCRIPTORS: DESCRIPTORS: ACHING;CRAMPING

## 2024-08-11 NOTE — LACTATION NOTE
This note was copied from a baby's chart.  Initial Lactation Consultation - baby born vaginally yesterday to a  mom at 40 weeks gestation. Mom had an incraed blood loss of 4800 after delivery. Mom states she attempted to breast feed her first child but did not produce enough milk and had to supplement with formula. Mom is insulin dependent diabetic and baby is large for gestational age. Mom states this baby has been latching and nursing and she thinks she is hearing him swallow. Baby is sleepy today post circ.     I watched om latch the baby and gave her some tips on positioning. We were able to get baby latched and sucked for a few minutes but then fell asleep on the breast.     Mom will continue to offer the breast at least every 3 hours. I talked to mom about pumping for extra breast stimulation. She will call out for assistance of questions as needed.

## 2024-08-12 VITALS
HEIGHT: 65 IN | SYSTOLIC BLOOD PRESSURE: 122 MMHG | WEIGHT: 277 LBS | DIASTOLIC BLOOD PRESSURE: 80 MMHG | RESPIRATION RATE: 16 BRPM | TEMPERATURE: 97.6 F | OXYGEN SATURATION: 99 % | BODY MASS INDEX: 46.15 KG/M2 | HEART RATE: 90 BPM

## 2024-08-12 PROBLEM — F41.9 ANXIETY: Status: ACTIVE | Noted: 2024-08-12

## 2024-08-12 PROBLEM — D64.9 ACUTE ON CHRONIC ANEMIA: Status: ACTIVE | Noted: 2024-08-12

## 2024-08-12 LAB
ABO + RH BLD: NORMAL
BLD PROD TYP BPU: NORMAL
BLD PROD TYP BPU: NORMAL
BLOOD BANK BLOOD PRODUCT EXPIRATION DATE: NORMAL
BLOOD BANK BLOOD PRODUCT EXPIRATION DATE: NORMAL
BLOOD BANK DISPENSE STATUS: NORMAL
BLOOD BANK DISPENSE STATUS: NORMAL
BLOOD BANK ISBT PRODUCT BLOOD TYPE: 5100
BLOOD BANK ISBT PRODUCT BLOOD TYPE: 9500
BLOOD BANK PRODUCT CODE: NORMAL
BLOOD BANK PRODUCT CODE: NORMAL
BLOOD BANK UNIT TYPE AND RH: NORMAL
BLOOD BANK UNIT TYPE AND RH: NORMAL
BLOOD GROUP ANTIBODIES SERPL: NORMAL
BPU ID: NORMAL
BPU ID: NORMAL
CROSSMATCH RESULT: NORMAL
CROSSMATCH RESULT: NORMAL
HCT VFR BLD AUTO: 21.7 % (ref 35–47)
HGB BLD-MCNC: 6.9 G/DL (ref 11.5–16)
RPR SER QL: NONREACTIVE
SPECIMEN EXP DATE BLD: NORMAL
UNIT DIVISION: 0
UNIT DIVISION: 0
UNIT ISSUE DATE/TIME: NORMAL
UNIT ISSUE DATE/TIME: NORMAL

## 2024-08-12 PROCEDURE — 2580000003 HC RX 258: Performed by: STUDENT IN AN ORGANIZED HEALTH CARE EDUCATION/TRAINING PROGRAM

## 2024-08-12 PROCEDURE — 85018 HEMOGLOBIN: CPT

## 2024-08-12 PROCEDURE — 6360000002 HC RX W HCPCS: Performed by: STUDENT IN AN ORGANIZED HEALTH CARE EDUCATION/TRAINING PROGRAM

## 2024-08-12 PROCEDURE — 36415 COLL VENOUS BLD VENIPUNCTURE: CPT

## 2024-08-12 PROCEDURE — 6370000000 HC RX 637 (ALT 250 FOR IP): Performed by: MIDWIFE

## 2024-08-12 PROCEDURE — 2580000003 HC RX 258: Performed by: MIDWIFE

## 2024-08-12 PROCEDURE — 85014 HEMATOCRIT: CPT

## 2024-08-12 PROCEDURE — 6370000000 HC RX 637 (ALT 250 FOR IP): Performed by: OBSTETRICS & GYNECOLOGY

## 2024-08-12 RX ORDER — SODIUM FERRIC GLUCONATE COMPLEX IN SUCROSE 12.5 MG/ML
125 INJECTION INTRAVENOUS ONCE
Status: DISCONTINUED | OUTPATIENT
Start: 2024-08-12 | End: 2024-08-12 | Stop reason: SDUPTHER

## 2024-08-12 RX ADMIN — SODIUM CHLORIDE 125 MG: 9 INJECTION, SOLUTION INTRAVENOUS at 12:30

## 2024-08-12 RX ADMIN — ACETAMINOPHEN 1000 MG: 500 TABLET ORAL at 03:47

## 2024-08-12 RX ADMIN — SERTRALINE HYDROCHLORIDE 150 MG: 50 TABLET ORAL at 08:45

## 2024-08-12 RX ADMIN — SODIUM CHLORIDE, PRESERVATIVE FREE 10 ML: 5 INJECTION INTRAVENOUS at 08:45

## 2024-08-12 RX ADMIN — SODIUM CHLORIDE: 9 INJECTION, SOLUTION INTRAVENOUS at 12:21

## 2024-08-12 RX ADMIN — ACETAMINOPHEN 1000 MG: 500 TABLET ORAL at 12:19

## 2024-08-12 RX ADMIN — METFORMIN HYDROCHLORIDE 1000 MG: 500 TABLET ORAL at 08:45

## 2024-08-12 ASSESSMENT — PAIN DESCRIPTION - DESCRIPTORS: DESCRIPTORS: ACHING

## 2024-08-12 ASSESSMENT — PAIN DESCRIPTION - ORIENTATION: ORIENTATION: LOWER

## 2024-08-12 ASSESSMENT — PAIN SCALES - GENERAL
PAINLEVEL_OUTOF10: 2
PAINLEVEL_OUTOF10: 0

## 2024-08-12 ASSESSMENT — PAIN - FUNCTIONAL ASSESSMENT
PAIN_FUNCTIONAL_ASSESSMENT: ACTIVITIES ARE NOT PREVENTED
PAIN_FUNCTIONAL_ASSESSMENT: ACTIVITIES ARE NOT PREVENTED

## 2024-08-12 ASSESSMENT — PAIN DESCRIPTION - LOCATION: LOCATION: ABDOMEN

## 2024-08-12 NOTE — DISCHARGE SUMMARY
Obstetrical Discharge Summary     Name: Kenya Sheriff MRN: [id number]  SSN: (Not on file)    YOB: 1990  Age: 34 y.o.  Sex: female      Admit Date: 2024    Discharge Date: 2024    Admitting Physician: Elsie Gil MD     Attending Physician:  Carolyn Salazar MD     Admission Diagnoses: Uterine contractions [O47.9]    Discharge Diagnoses:   Information for the patient's :  DAWIT Sheriff [[id number]]   @[id number]@     Additional Diagnoses:  No components found for: \"OBEXTABORH\", \"OBEXTABSCRN\", \"OBEXTRUBELLA\", \"OBEXTGRBS\"    Hospital Course:  Patient was admitted for SROM. Pregnancy complicated by ?DM (on metformin), anxiety (on sertraline), anemia, BMI 40. Intrapartum course was uncomplicated. Proceeded to have  @ 40w0d resulting in viable male infant. Please see delivery note for more details. Postpartum course has been complicated by PPH and symptomatic anemia. She received 2u pRBCS and IV iron infusions. She is feeling much improved. Patient is meeting postpartum milestones.     Patient Instructions:   Current Discharge Medication List        START taking these medications    Details   docusate sodium (COLACE, DULCOLAX) 100 MG CAPS Take 100 mg by mouth 2 times daily      ibuprofen (ADVIL;MOTRIN) 800 MG tablet Take 1 tablet by mouth every 8 hours as needed for Pain  Qty: 120 tablet, Refills: 3           CONTINUE these medications which have NOT CHANGED    Details   Sertraline HCl (ZOLOFT PO) Take 150 mg by mouth daily      ferrous sulfate (IRON 325) 325 (65 Fe) MG tablet Take 1 tablet by mouth daily (with breakfast)      Misc. Devices KIT Certifi Prenatal Gummy/Ferrous gluconate 324mg ([facility] Baby Box);  Take 2 prenatal gummies and 1 ferrous gluconate tablet po QD or as instructed by provider;  #qs for 3 months  Qty: 1 kit, Refills: 3           STOP taking these medications       metFORMIN (GLUCOPHAGE) 1000 MG tablet Comments:   Reason for Stopping:         aspirin 81 MG

## 2024-08-12 NOTE — DISCHARGE INSTRUCTIONS
your use of this information.  Postpartum Support Groups  We know that all of us are dealing with a tremendous amount of uncertainty, confusion and disruption to our daily lives, which may result in increased anxiety, depression and fear. If you are feeling unsettled or worse, please know that we are here to help. During this time of increased caution and care for one another, [facility] [place] ([facility]) is offering virtual support groups to ALL MOTHERS in [place] regardless of the age of your child/children as a way to help weather this emotional storm together. Social support is an important part of self-care during this time of physical distancing.  Virtual postpartum support group meetings available at www.[facility].org  Warm Line: [phone]    Breastfeeding Support Groups   1st and 3rd Wednesday of each month at [place]  2nd and 4th Tuesday of each month at [place]      [email or web address]      POST DELIVERY DISCHARGE INSTRUCTIONS    Name: Kenya Sheriff  YOB: 1990    General:     Diet/Diet Restrictions:  Eight 8-ounce glasses of fluid daily (water, juices); avoid excessive caffeine intake.  Meals/snacks as desired which are high in fiber and carbohydrates and low in fat and cholesterol.    Physical Activity / Restrictions / Safety:     Avoid heavy lifting, no more that 8 lbs. For 2-3 weeks;   Limit use of stairs to 2 times daily for the first week home.   No driving for one week.  Avoid intercourse 4-6 weeks, no douching or tampon use.   Check with obstetrician before starting or resuming an exercise program.      Discharge Instructions/Special Treatment/Home Care Needs:     Continue prenatal vitamins.  Continue to use squirt bottle with warm water on your episiotomy after each bathroom use until bleeding stops.  If steri-strips applied to your incision, remove in 7-10 days.    Call your doctor for the following:     Fever over 101

## 2024-11-07 NOTE — PATIENT INSTRUCTIONS
"Daily Note     Today's date: 2024  Patient name: Edgar Obando  : 1955  MRN: [id number]  Referring provider: Morelia Sethi*  Dx:   Encounter Diagnosis     ICD-10-CM    1. Chronic neck pain  M54.2     G89.29       2. Poor balance  R26.89       3. S/P cervical spinal fusion  Z98.1                      Subjective: Did go to see the spine physician yesterday. Had a good appointment overall. I did start a steroid medication in attempts to help with the pain.       Objective: See treatment diary below      Assessment: Tolerated treatment well. Patient continues to be appropriately challenged at current therapeutic volume and intensity. Patient would benefit from continued PT. 1:1 with PT for 40 minutes.       Plan: Continue per plan of care.      POC expires Auth Expiration date PT/OT + Visit Limit?   10/8/2024  2024 99        Visit/Unit Tracking  AUTH Status:  Date 10/17 10/21 10/24 10/29   [employer] [facility] - BOMN Used 17 18 19 20            Precautions:  s/p C3-7 ACDF on 2024       Manuals    Cervical PROM                    Neuro Re-Ed     Patient Ed     Digi      Row 2x10 15# 2x10 15#   LPD Seated     BSER     Standing hip abduction 2x10 2# b/l 2x10 2# b/l   Nancy Negotiation     Half Tandem Balance 2x30\" b/l 2x30\" b/l   LOS Biodex     Resisted Sidestep     Ther Ex     UBE    Vital monitoring     Neck Circles     Shoulder Flexion with Dowel in Chair     Thoracic Extension Chair 2x10 5\" 2x10 5\"    Wall Slide     Cervical AROM Each Way     Walking on Foam Tandem/Sidesteps 4 laps ea hha 4 laps ea hha   Seated Row      Sit to Stand 2x10 15# kb 2x10 15# kb   Ther Activity               Gait Training               Modalities                       Access Code: [email or web address]  URL: [email or web address]  Date: 2024  Prepared by: Mary Puga    Exercises  - Seated Shoulder Row with Anchored Resistance  - 1 x daily - 7 x weekly - 2 sets - 10 reps - 5 hold  - Seated " Learning About When to Call Your Doctor During Pregnancy (After 20 Weeks)  Your Care Instructions  It's common to have concerns about what might be a problem during pregnancy. Although most pregnant women don't have any serious problems, it's important to know when to call your doctor if you have certain symptoms or signs of labor. These are general suggestions. Your doctor may give you some more information about when to call. When to call your doctor (after 20 weeks)  Call 911 anytime you think you may need emergency care. For example, call if:  · You have severe vaginal bleeding. · You have sudden, severe pain in your belly. · You passed out (lost consciousness). · You have a seizure. · You see or feel the umbilical cord. · You think you are about to deliver your baby and can't make it safely to the hospital.  Call your doctor now or seek immediate medical care if:  · You have vaginal bleeding. · You have belly pain. · You have a fever. · You have symptoms of preeclampsia, such as:  ? Sudden swelling of your face, hands, or feet. ? New vision problems (such as dimness, blurring, or seeing spots). ? A severe headache. · You have a sudden release of fluid from your vagina. (You think your water broke.)  · You think that you may be in labor. This means that you've had at least 6 contractions in an hour. · You notice that your baby has stopped moving or is moving much less than normal.  · You have symptoms of a urinary tract infection. These may include:  ? Pain or burning when you urinate. ? A frequent need to urinate without being able to pass much urine. ? Pain in the flank, which is just below the rib cage and above the waist on either side of the back. ? Blood in your urine. Watch closely for changes in your health, and be sure to contact your doctor if:  · You have vaginal discharge that smells bad. · You have skin changes, such as:  ? A rash. ? Itching.   ? Yellow color to your Shoulder Extension and Scapular Retraction with Resistance  - 1 x daily - 7 x weekly - 2 sets - 12 reps - 5 hold  - Side Stepping with Counter Support  - 1 x daily - 7 x weekly - 1 sets - 10 reps  - Sit to Stand  - 1 x daily - 7 x weekly - 3 sets - 8 reps  - Seated Cat Cow  - 1 x daily - 7 x weekly - 2 sets - 10 reps  - Standing Hip Abduction with Counter Support  - 1 x daily - 7 x weekly - 2 sets - 10 reps                                                skin.  · You have other concerns about your pregnancy. If you have labor signs at 37 weeks or more  If you have signs of labor at 37 weeks or more, your doctor may tell you to call when your labor becomes more active. Symptoms of active labor include:  · Contractions that are regular. · Contractions that are less than 5 minutes apart. · Contractions that are hard to talk through. Follow-up care is a key part of your treatment and safety. Be sure to make and go to all appointments, and call your doctor if you are having problems. It's also a good idea to know your test results and keep a list of the medicines you take. Where can you learn more? Go to http://www.gray.com/  Enter N531 in the search box to learn more about \"Learning About When to Call Your Doctor During Pregnancy (After 20 Weeks). \"  Current as of: October 8, 2020               Content Version: 12.8  © 2006-2021 [employer]. Care instructions adapted under license by [employer] (which disclaims liability or warranty for this information). If you have questions about a medical condition or this instruction, always ask your healthcare professional. [place] [address] any warranty or liability for your use of this information. Cleft Lip and Cleft Palate in Children: Care Instructions  Overview  Cleft lip and cleft palate are fairly common birth defects that can be treated with surgery. They often occur together. Cleft lip happens when the tissues of the upper jaw and nose do not join properly as a baby develops. This causes a split (cleft) in the lip. In most cases a cleft lip does not cause health problems. Cleft palate happens when the roof of the mouth (palate) does not develop normally during pregnancy. This leaves an opening that may go through to the nasal cavity.  It may affect any part of the palate, including the front part of the roof of the mouth (hard palate) or the small tag of tissue that hangs down from the soft palate (uvula). Some babies with cleft palates have trouble feeding. Both these conditions are treated with surgery. Cleft lip is repaired in the first few months of a child's life; the timing depends on how bad it is. Cleft palate is usually fixed before 15months of age, and it often takes more than one surgery. If your child is born with a cleft lip or palate, it is normal to have concerns and feelings that may include anger, fear, guilt, depression, or denial. You may wonder how your friends, relatives, other children, and even strangers will react to your baby's appearance. Try to focus on bonding with your baby; the other issues will not seem as important over time. Follow-up care is a key part of your child's treatment and safety. Be sure to make and go to all appointments, and call your doctor if your child is having problems. It's also a good idea to know your child's test results and keep a list of the medicines your child takes. How can you care for your child at home? Caring for a child with a cleft lip  · A cleft lip usually does not cause other symptoms or feeding problems. Babies born with cleft lips often can breastfeed well. Bottle-feeding usually is not a problem if you use special nipples that are a different shape and have hole openings that are slightly bigger than normal.  · If a cleft lip occurs with a cleft palate, it is sometimes associated with another health condition. For this reason, it is important for children to be tested for other conditions starting at birth, especially if a baby has other symptoms. Some children with cleft lips may need other tests too, such as tests for speech and hearing problems. Caring for a child with a cleft palate  · Babies with cleft palates have problems sucking and swallowing, so feeding can be a challenge.   · Watch for signs of dehydration, which can develop if your baby is not getting enough breast milk or formula. These signs include fewer wet diapers, sunken eyes with few tears, and a dry mouth with little or no spit. · Bottle-feeding usually works better than breastfeeding. Some mothers pump their breast milk and give it to their babies in bottles. · Watch your baby for problems with choking, gagging, or milk coming out through the nose while feeding. You may wish to use a small plastic plate, called an obturator, that fits into the roof of the baby's mouth while feeding. This blocks the opening so the baby can suck properly. · As your child grows, pay attention to:  ? Dental care. Children with cleft palates may have special dental problems and need to learn good dental habits early in life. ? Hearing. Babies with cleft palates need to have their hearing tested by the time they are 1 months old. In some cases, babies born with a cleft palate need ear tubes inserted through surgery. This helps the middle ear work well. It can also restore hearing, reduce pain, and prevent middle ear infections and future hearing problems. · Sometimes a cleft palate needs treatment before surgery. This may include special dental splints or soft dental molding inserts. · You may find it helpful to talk with your doctor or to see a counselor. Also, you may want to join a support group, which lets you talk with other parents who have babies with a cleft lip or cleft palate. When should you call for help? Call 911 anytime you think your child may need emergency care. For example, call if:    · Your child has severe trouble breathing. Signs may include the chest sinking in, using belly muscles to breathe, or nostrils flaring while your child is struggling to breathe. Call your doctor now or seek immediate medical care if:    · Your child has signs of needing more fluids.  These signs include sunken eyes with few tears, a dry mouth with little or no spit, and little or no urine for 6 hours.     · Your child has signs of infection, such as:  ? Increased pain, swelling, warmth, or redness. ? Red streaks leading from the area. ? Pus draining from the area. ? A fever. Watch closely for changes in your child's health, and be sure to contact your doctor if:    · You are worried that your child's surgical scar is not healing right.     · You are worried that your child is not hearing well.     · Your child does not get better as expected. Where can you learn more? Go to http://www.gray.com/  Enter H667 in the search box to learn more about \"Cleft Lip and Cleft Palate in Children: Care Instructions. \"  Current as of: May 27, 2020               Content Version: 12.8  © 2006-2021 [employer]. Care instructions adapted under license by [employer] (which disclaims liability or warranty for this information). If you have questions about a medical condition or this instruction, always ask your healthcare professional. [place] [address] any warranty or liability for your use of this information. Cleft Palate Repair: Before Your Child's Surgery  What is cleft palate repair? The roof of the mouth is called the palate. Cleft palate repair is surgery to fix a split (cleft) in the palate. The doctor will make a cut along the edge of the cleft inside your child's mouth. This cut is called an incision. The doctor will use stitches to bring the cut edges together to cover the split. Your child will be asleep during the surgery. Your child may need more than one surgery to make the repair. The first surgery is usually done before a child is 13 months old. Most children have a short hospital stay after surgery. It usually takes about 3 to 4 weeks for a child to heal.  After surgery, it will be easier for your child to eat, breathe, and make the sounds needed for speech.  Some children need more surgery on their lips, nose, or mouth as they get older to improve their speech. Follow-up care is a key part of your child's treatment and safety. Be sure to make and go to all appointments, and call your doctor if your child is having problems. It's also a good idea to know your child's test results and keep a list of the medicines your child takes. How do you prepare for surgery? Surgery can be stressful for both your child and you. This information will help you understand what you can expect. And it will help you safely prepare for your child's surgery. Preparing for surgery    · Talk to your child about the surgery. Tell your child that the surgery will probably make it easier to eat, breathe, and make the sounds needed for speech. Hospitals know how to take care of children. The staff will do all they can to make it easier for your child.     · Ask if a special tour of the surgery area and hospital is available. This may make your child feel less nervous about what happens.     · Plan for your child's recovery time. He or she may need more of your time right after the surgery, both for care and for comfort.     · Understand exactly what surgery is planned, along with the risks, benefits, and other options.     · Tell the doctor ALL the medicines, vitamins, supplements, and herbal remedies your child takes. Some may increase the risk of problems during the surgery. Your doctor will tell you if your child should stop taking any of them before the surgery and how soon to do it. The day before surgery    · A nurse may call you (or you may need to call the hospital). This is to confirm the time and date of your child's surgery and answer any questions.     · Remember to follow your doctor's instructions about your child taking or stopping medicines before surgery. This includes over-the-counter medicines. What happens on the day of surgery? · Follow the instructions exactly about when your child should stop eating and drinking. If you don't, the surgery may be canceled. If the doctor told you to have your child take his or her medicines on the day of surgery, have your child take them with only a sip of water.     · Have your child take a bath or shower before you come in. Do not apply lotion or deodorant.     · Your child may brush his or her teeth. But tell your child not to swallow any toothpaste or water.     · Be sure your child has something that reminds him or her of home. A special stuffed animal, toy, or blanket may be comforting. For an older child, it might be a book or music. At the hospital or surgery center    · A parent or legal guardian must accompany your child.     · Your child will be kept comfortable and safe by the anesthesia provider. Your child will be asleep during the surgery.     · The surgery will take about 1 to 3 hours.     · After surgery, your child will be taken to the recovery room. As your child wakes up, the recovery room staff will monitor his or her condition. The doctor will talk to you about the surgery.     · You will probably be able to take your child home 1 to 2 days after the surgery. When should you call your doctor? · You have questions or concerns.     · You don't understand how to prepare your child for the surgery.     · Your child becomes ill before the surgery (such as fever, flu, or a cold).     · You need to reschedule or have changed your mind about your child having the surgery. Where can you learn more? Go to http://www.gray.com/  Enter P745 in the search box to learn more about \"Cleft Palate Repair: Before Your Child's Surgery. \"  Current as of: May 27, 2020               Content Version: 12.8  © 2006-2021 [employer]. Care instructions adapted under license by [employer] (which disclaims liability or warranty for this information).  If you have questions about a medical condition or this instruction, always ask your healthcare professional. Loraine Gibson disclaims any warranty or liability for your use of this information. Learning About Mild Ventricular Septal Defect in Newborns  What is a ventricular septal defect? A ventricular septal defect is a type of congenital heart defect. Congenital heart defects are heart problems a baby is born with. These heart problems are usually diagnosed at or before birth. The heart is a muscular pump with four chambers. The two bottom chambers--the left ventricle and the right ventricle--are  by a wall of tissue called a septum. A ventricular septal defect is a hole in this wall. A very small hole may not cause problems. It may close on its own. When the hole is large, some of the blood may flow through it from the left ventricle to the right ventricle. So the heart may pump too much blood to the lungs. Over time, this can damage the lungs and weaken the heart. How is ventricular septal defect diagnosed? Your doctor may hear abnormal heart sounds, such as a heart murmur, when he or she examines your . Your doctor will order tests to find the cause of abnormal sounds or of symptoms. The most common test used to identify this defect is called an echocardiogram, or \"echo\" for short. It uses sound waves to make an image of your baby's heart. Other tests, such as an EKG (electrocardiogram), chest X-ray, and checking the amount of oxygen in the blood, also help identify the problem. A fetal ultrasound, which looks at the baby's heart, may find this defect before birth. What are the symptoms? Many babies have no symptoms, especially if the hole is small. The hole may close on its own over time. If the hole is larger and the heart has to work too hard, a baby may have symptoms, such as trouble breathing or fast breathing. How is it treated? If the hole is large enough to cause symptoms, your doctor may suggest a treatment called catheterization to close the hole.   Your baby will be asleep during this treatment. The doctor puts a thin tube into a blood vessel in your child's groin. This tube is called a catheter. The doctor will move the catheter through the blood vessel to the heart. A dye can be put into the catheter. The doctor can take X-ray pictures of the dye as it moves through your child's heart and blood vessels. The pictures show exactly where the hole is. Then the doctor moves special tools through the catheter to the heart. The doctor uses these tools to close the hole. Then the tools and the catheter are removed. Some babies may need surgery to close the hole. Your doctor will explain what symptoms to watch for at home. Regular checkups will help your doctor watch your baby for symptoms over time. What can you expect? Your doctor will make sure that you have all the information you need to take care of your baby at home. Many babies won't have any symptoms. They will only need regular checkups. But it is important to watch for symptoms that may mean there is a problem. These include:  · Fast breathing. · Sweating while feeding. · Not eating well. · Not gaining enough weight. Follow-up care is a key part of your child's treatment and safety. Be sure to make and go to all appointments, and call your doctor if your child is having problems. It's also a good idea to know your child's test results and keep a list of the medicines your child takes. Where can you learn more? Go to http://www.gray.com/  Enter A645 in the search box to learn more about \"Learning About Mild Ventricular Septal Defect in Newborns. \"  Current as of: August 31, 2020               Content Version: 12.8  © 2006-2021 [employer]. Care instructions adapted under license by [employer] (which disclaims liability or warranty for this information).  If you have questions about a medical condition or this instruction, always ask your healthcare professional. [employer] disclaims any warranty or liability for your use of this information. Weeks 26 to 30 of Your Pregnancy: Care Instructions  Overview     You are now entering your last trimester of pregnancy. Your baby is growing quickly. Kathrine Levy probably feel your baby moving around more often. Your doctor may ask you to count your baby's kicks. Your back may ache as your body gets used to your baby's size and length. If you haven't already had the Tdap shot during this pregnancy, talk to your doctor about getting it. It will help protect your  against pertussis infection. During this time, it's important to take care of yourself and pay attention to what your body needs. If you feel sexual, you can explore ways to be close with your partner that match your comfort and desire. Follow-up care is a key part of your treatment and safety. Be sure to make and go to all appointments, and call your doctor if you are having problems. It's also a good idea to know your test results and keep a list of the medicines you take. How can you care for yourself at home? Take it easy at work  · Take frequent breaks. If possible, stop working when you are tired, and rest during your lunch hour. · Take bathroom breaks every 2 hours. · Change positions often. If you sit for long periods, stand up and walk around. · When you stand for a long time, keep one foot on a low stool with your knee bent. After standing a lot, sit with your feet up. · Avoid fumes, chemicals, and tobacco smoke. Be sexual in your own way  · Having sex during pregnancy is okay, unless your doctor tells you not to. · You may be very interested in sex, or you may have no interest at all. · Your growing belly can make it hard to find a good position during intercourse. [place] and explore. · You may get cramps in your uterus when your partner touches your breasts.   · A back rub may relieve the backache or cramps that sometimes follow orgasm. Learn about  labor  · Watch for signs of  labor. You may be going into labor if:  ? You have menstrual-like cramps, with or without nausea. ? You have about 6 or more contractions in 1 hour, even after you have had a glass of water and are resting. ? You have a low, dull backache that does not go away when you change your position. ? You have pain or pressure in your pelvis that comes and goes in a pattern. ? You have intestinal cramping or flu-like symptoms, with or without diarrhea.  ? You notice an increase or change in your vaginal discharge. Discharge may be heavy, mucus-like, watery, or streaked with blood. ? Your water breaks. · If you think you have  labor:  ? Drink 2 or 3 glasses of water or juice. Not drinking enough fluids can cause contractions. ? Stop what you are doing, and empty your bladder. Then lie down on your left side for at least 1 hour. ? While lying on your side, find your breast bone. Put your fingers in the soft spot just below it. Move your fingers down toward your belly button to find the top of your uterus. Check to see if it is tight. ? Contractions can be weak or strong. Record your contractions for an hour. Time a contraction from the start of one contraction to the start of the next one.  ? Single or several strong contractions without a pattern are called Johann-Nunez contractions. They are practice contractions but not the start of labor. They often stop if you change what you are doing. ? Call your doctor if you have regular contractions. Where can you learn more? Go to http://www.gray.com/  Enter D547 in the search box to learn more about \"Weeks 26 to 30 of Your Pregnancy: Care Instructions. \"  Current as of: 2020               Content Version: 12.8  © 6408-3115 [employer]).    Care instructions adapted under license by [employer] (which disclaims liability or warranty for this information). If you have questions about a medical condition or this instruction, always ask your healthcare professional. [place] [address] any warranty or liability for your use of this information.

## 2025-01-06 ENCOUNTER — TELEMEDICINE (OUTPATIENT)
Age: 35
End: 2025-01-06
Payer: COMMERCIAL

## 2025-01-06 DIAGNOSIS — F41.9 ANXIETY: Primary | ICD-10-CM

## 2025-01-06 DIAGNOSIS — E78.1 PURE HYPERTRIGLYCERIDEMIA: ICD-10-CM

## 2025-01-06 DIAGNOSIS — E66.01 OBESITY, MORBID: ICD-10-CM

## 2025-01-06 PROCEDURE — 99214 OFFICE O/P EST MOD 30 MIN: CPT | Performed by: INTERNAL MEDICINE

## 2025-01-06 RX ORDER — SERTRALINE HYDROCHLORIDE 100 MG/1
150 TABLET, FILM COATED ORAL DAILY
Qty: 270 TABLET | Refills: 1 | Status: SHIPPED | OUTPATIENT
Start: 2025-01-06

## 2025-01-06 ASSESSMENT — PATIENT HEALTH QUESTIONNAIRE - PHQ9
SUM OF ALL RESPONSES TO PHQ QUESTIONS 1-9: 1
2. FEELING DOWN, DEPRESSED OR HOPELESS: SEVERAL DAYS
SUM OF ALL RESPONSES TO PHQ9 QUESTIONS 1 & 2: 1
1. LITTLE INTEREST OR PLEASURE IN DOING THINGS: NOT AT ALL
SUM OF ALL RESPONSES TO PHQ QUESTIONS 1-9: 1

## 2025-01-06 ASSESSMENT — ENCOUNTER SYMPTOMS: SHORTNESS OF BREATH: 0

## 2025-01-06 NOTE — PROGRESS NOTES
HISTORY OF PRESENT ILLNESS  Kenya Sheriff is a 34 y.o. female.  HPI    This is an established visit completed with telemedicine was completed with video assist. The patient acknowledges and agrees to this method of visitation.      Last here 10/9/23. Pt is here for routine care.       BP 8/24 122/80      Wt  lov 257  Continues on metformin 1000 BID   She has been seeing dietician at [facility] and following w/ Dr. Green for wt/l, lov 8/5/23  No longer taking Mounjaro 2.5 mg as it is no longer covered  She is no longer on phenteramine for wt/l   She just restarted topamax 25 mg x 1 week  Have discussed topomax is teratogenic, advised her to take additional precautions to prevent pregnancy     Reviewed labs   Ordered labs     Pt follows with Dr Jules (GI)  Last visit in 8/18  Has famotidine as needed     She is taking zoloft 150 mg for anxiety  This has been working well 1/25  She is also seeing a therapist      Previously pt c/o menstrual HA     Pt follows with Dr Burden (gyn)   Last visit 3/24/20  She has had 2 miscarriages, first one 8/19 second 3/20  Pt gave birth to a baby boy Davin on 8/13/21   She has another baby boy Vijay who is 4 months old, was born 8/24   She had to have 2 blood transfusions and 2 iron infusions, states her anemia had resolved and she is no longer taking an iron supplement     She had gestational diabetes, this has resolved was primarily diet-controlled  She had suction dilation and curettage 8/15/19 and 3/26/20  She is now seeing Dr. Green (gyn)   No longer taking Mounjaro 2.5 mg weekly for wt/l, no longer on phentermine  She is also following with a dietician    She saw Dr Mccracken (derm) for annual skin check  Lov 11/24      She notes her father was in the hospital for incidental descending aortic dissection type B, his brothers had similar issues  She wonders if she needs screening or needs to see cardiology  Advised her to check with her father's cardiologist to confirm  Discussed 
133  A1c: 7/18 5.2 7/21 5.0 9/21 5.4 10/22 5.4 10/23 5.4  Hep C: 9/21  Covid: both (Pfizer), discussed bivalent booster, she has never had covid  Patient Active Problem List   Diagnosis    Uterine contractions    Postpartum hemorrhage    Acute on chronic anemia    Anxiety    Gastroesophageal reflux disease without esophagitis    Anxiety    Obesity, morbid     Current Outpatient Medications   Medication Sig Dispense Refill    Sertraline HCl (ZOLOFT PO) Take 150 mg by mouth daily      docusate sodium (COLACE, DULCOLAX) 100 MG CAPS Take 100 mg by mouth 2 times daily      ibuprofen (ADVIL;MOTRIN) 800 MG tablet Take 1 tablet by mouth every 8 hours as needed for Pain 120 tablet 3    ferrous sulfate (IRON 325) 325 (65 Fe) MG tablet Take 1 tablet by mouth daily (with breakfast)      phentermine 15 MG capsule       metFORMIN (GLUCOPHAGE-XR) 500 MG extended release tablet       sertraline (ZOLOFT) 100 MG tablet Take 1.5 tablets by mouth daily 270 tablet 1    cetirizine (ZYRTEC) 10 MG tablet Take 1 tablet by mouth daily      famotidine (PEPCID) 20 MG tablet Take 1 tablet by mouth 2 times daily      topiramate (TOPAMAX) 25 MG tablet ceived the following from [facility]: Outside name: topiramate (TOPAMAX) 25 mg tablet      Misc. Devices KIT Certifi Prenatal Gummy/Ferrous gluconate 324mg ([facility] Baby Box);  Take 2 prenatal gummies and 1 ferrous gluconate tablet po QD or as instructed by provider;  #qs for 3 months 1 kit 3     No current facility-administered medications for this visit.     Past Surgical History:   Procedure Laterality Date    DILATION AND CURETTAGE OF UTERUS  08/2019    OTHER SURGICAL HISTORY      D & C     OTHER SURGICAL HISTORY      tonsillectomy and wisdom teeth     OTHER SURGICAL HISTORY      D & C     TONSILLECTOMY      UPPER GI ENDOSCOPY,BIOPSY  5/8/2018         WISDOM TOOTH EXTRACTION           Lab Results   Component Value Date    WBC 12.7 (H) 08/11/2024    HGB 6.9 (L) 08/12/2024    HCT

## (undated) DEVICE — NEEDLE SPNL 22GA L3.5IN BLK HUB S STL REG WALL FIT STYL W/

## (undated) DEVICE — SOL IRRIGATION INJ NACL 0.9% 500ML BTL

## (undated) DEVICE — TOWEL SURG W17XL27IN STD BLU COT NONFENESTRATED PREWASHED

## (undated) DEVICE — SOLIDIFIER MEDC 1200ML -- CONVERT TO 356117

## (undated) DEVICE — COVER LT HNDL PLAS RIG 1 PER PK

## (undated) DEVICE — BAG SPEC BIOHZRD 10 X 10 IN --

## (undated) DEVICE — INFECTION CONTROL KIT SYS

## (undated) DEVICE — Device

## (undated) DEVICE — BLOCK BITE ENDOSCP AD 21 MM W/ DIL BLU LF DISP

## (undated) DEVICE — KENDALL RADIOLUCENT FOAM MONITORING ELECTRODE RECTANGULAR SHAPE: Brand: KENDALL

## (undated) DEVICE — GOWN,SIRUS,FABRNF,XL,20/CS: Brand: MEDLINE

## (undated) DEVICE — CATH IV AUTOGRD BC PNK 20GA 25 -- INSYTE

## (undated) DEVICE — SOLUTION LACTATED RINGERS INJECTION USP

## (undated) DEVICE — MEDI-VAC YANK SUCT HNDL W/TPRD BULBOUS TIP: Brand: CARDINAL HEALTH

## (undated) DEVICE — SYR 10ML LUER LOK 1/5ML GRAD --

## (undated) DEVICE — SET ADMIN 16ML TBNG L100IN 2 Y INJ SITE IV PIGGY BK DISP

## (undated) DEVICE — CONTAINER SPEC 20 ML LID NEUT BUFF FORMALIN 10 % POLYPR STS

## (undated) DEVICE — D&C/GYN-LF: Brand: MEDLINE INDUSTRIES, INC.

## (undated) DEVICE — NEEDLE HYPO 18GA L1.5IN PNK S STL HUB POLYPR SHLD REG BVL

## (undated) DEVICE — AMBU SPUR II ADULT WITH OPEN RESERVOIR 40, WITH DURACLEAR FACE MASK SIZE MEDIUM ADULT AND WITH PEEP VALVE. 6 PCS/BOX: Brand: SPUR® II ADULT RESUSCITATORSINGLE PATIENT USE RESUSCITATOR

## (undated) DEVICE — BASIN EMSIS 16OZ GRAPHITE PLAS KID SHP MOLD GRAD FOR ORAL

## (undated) DEVICE — TRAP TISS DISP FOR COLL SYS BERK SAFETOUCH

## (undated) DEVICE — TUBING SUCT L6FT ID0.5IN CLR PLAS M CONN

## (undated) DEVICE — TOWEL 4 PLY TISS 19X30 SUE WHT

## (undated) DEVICE — CONTINU-FLO SOLUTION SET, 2 INJECTION SITES, MALE LUER LOCK ADAPTER WITH RETRACTABLE COLLAR, LARGE BORE STOPCOCK WITH ROTATING MALE LUER LOCK EXTENSION SET, 2 INJECTION SITES, MALE LUER LOCK ADAPTER WITH RETRACTABLE COLLAR: Brand: INTERLINK/CONTINU-FLO

## (undated) DEVICE — FORCEPS BX L160CM DIA8MM GRSP DISECT CUP TIP NONLOCKING ROT

## (undated) DEVICE — KENDALL DL ECG CABLE AND LEAD WIRE SYSTEM, 3-LEAD, SINGLE PATIENT USE: Brand: KENDALL

## (undated) DEVICE — 1200 GUARD II KIT W/5MM TUBE W/O VAC TUBE: Brand: GUARDIAN

## (undated) DEVICE — Z DISCONTINUED PER MEDLINE LINE GAS SAMPLING O2/CO2 LNG AD 13 FT NSL W/ TBNG FILTERLINE

## (undated) DEVICE — STERILE POLYISOPRENE POWDER-FREE SURGICAL GLOVES: Brand: PROTEXIS

## (undated) DEVICE — MEDI-VAC YANK SUCT HNDL W/TPRD BULBOUS TIP & NON-CONDUCTIVE: Brand: CARDINAL HEALTH

## (undated) DEVICE — SYR 3ML LL TIP 1/10ML GRAD --

## (undated) DEVICE — FILTER SET UTER VAC CURET SYS -- GYRUS

## (undated) DEVICE — Z DISCONTINUED USE 2659133 CANNULA VAC DIA8MM CRV SEMI RIG W/ ROUNDED TIP TAPR END

## (undated) DEVICE — 3M™ TEGADERM™ TRANSPARENT FILM DRESSING FRAME STYLE, 1624W, 2-3/8 IN X 2-3/4 IN (6 CM X 7 CM), 100/CT 4CT/CASE: Brand: 3M™ TEGADERM™

## (undated) DEVICE — PREP PAD BNS: Brand: CONVERTORS

## (undated) DEVICE — NEONATAL-ADULT SPO2 SENSOR: Brand: NELLCOR